# Patient Record
Sex: FEMALE | Race: BLACK OR AFRICAN AMERICAN | NOT HISPANIC OR LATINO | Employment: UNEMPLOYED | URBAN - METROPOLITAN AREA
[De-identification: names, ages, dates, MRNs, and addresses within clinical notes are randomized per-mention and may not be internally consistent; named-entity substitution may affect disease eponyms.]

---

## 2017-08-18 ENCOUNTER — TRANSFERRED RECORDS (OUTPATIENT)
Dept: HEALTH INFORMATION MANAGEMENT | Facility: CLINIC | Age: 57
End: 2017-08-18

## 2017-10-05 ENCOUNTER — TRANSFERRED RECORDS (OUTPATIENT)
Dept: HEALTH INFORMATION MANAGEMENT | Facility: CLINIC | Age: 57
End: 2017-10-05

## 2017-11-13 ENCOUNTER — TRANSFERRED RECORDS (OUTPATIENT)
Dept: HEALTH INFORMATION MANAGEMENT | Facility: CLINIC | Age: 57
End: 2017-11-13

## 2017-11-14 ENCOUNTER — TRANSFERRED RECORDS (OUTPATIENT)
Dept: HEALTH INFORMATION MANAGEMENT | Facility: CLINIC | Age: 57
End: 2017-11-14

## 2020-06-12 ENCOUNTER — OFFICE VISIT (OUTPATIENT)
Dept: FAMILY MEDICINE | Facility: CLINIC | Age: 60
End: 2020-06-12
Payer: COMMERCIAL

## 2020-06-12 ENCOUNTER — ANCILLARY PROCEDURE (OUTPATIENT)
Dept: GENERAL RADIOLOGY | Facility: CLINIC | Age: 60
End: 2020-06-12
Attending: PHYSICIAN ASSISTANT
Payer: COMMERCIAL

## 2020-06-12 VITALS
RESPIRATION RATE: 19 BRPM | HEART RATE: 102 BPM | SYSTOLIC BLOOD PRESSURE: 141 MMHG | WEIGHT: 187 LBS | TEMPERATURE: 98.5 F | OXYGEN SATURATION: 98 % | DIASTOLIC BLOOD PRESSURE: 101 MMHG

## 2020-06-12 DIAGNOSIS — R60.9 EDEMA, UNSPECIFIED TYPE: ICD-10-CM

## 2020-06-12 DIAGNOSIS — R09.82 PND (POST-NASAL DRIP): ICD-10-CM

## 2020-06-12 DIAGNOSIS — I50.9 ACUTE ON CHRONIC CONGESTIVE HEART FAILURE, UNSPECIFIED HEART FAILURE TYPE (H): ICD-10-CM

## 2020-06-12 DIAGNOSIS — Z11.4 SCREENING FOR HIV (HUMAN IMMUNODEFICIENCY VIRUS): ICD-10-CM

## 2020-06-12 DIAGNOSIS — I10 BENIGN ESSENTIAL HYPERTENSION: Primary | ICD-10-CM

## 2020-06-12 DIAGNOSIS — Z11.59 ENCOUNTER FOR HEPATITIS C SCREENING TEST FOR LOW RISK PATIENT: ICD-10-CM

## 2020-06-12 LAB
ALBUMIN SERPL-MCNC: 3.1 G/DL (ref 3.4–5)
ALP SERPL-CCNC: 57 U/L (ref 40–150)
ALT SERPL W P-5'-P-CCNC: 43 U/L (ref 0–50)
ANION GAP SERPL CALCULATED.3IONS-SCNC: 6 MMOL/L (ref 3–14)
AST SERPL W P-5'-P-CCNC: 33 U/L (ref 0–45)
BASOPHILS # BLD AUTO: 0 10E9/L (ref 0–0.2)
BASOPHILS NFR BLD AUTO: 0.3 %
BILIRUB SERPL-MCNC: 0.8 MG/DL (ref 0.2–1.3)
BUN SERPL-MCNC: 13 MG/DL (ref 7–30)
CALCIUM SERPL-MCNC: 8.7 MG/DL (ref 8.5–10.1)
CHLORIDE SERPL-SCNC: 110 MMOL/L (ref 94–109)
CHOLEST SERPL-MCNC: 206 MG/DL
CO2 SERPL-SCNC: 24 MMOL/L (ref 20–32)
CREAT SERPL-MCNC: 0.88 MG/DL (ref 0.52–1.04)
CREAT UR-MCNC: 237 MG/DL
DIFFERENTIAL METHOD BLD: ABNORMAL
EOSINOPHIL # BLD AUTO: 0.2 10E9/L (ref 0–0.7)
EOSINOPHIL NFR BLD AUTO: 3.5 %
ERYTHROCYTE [DISTWIDTH] IN BLOOD BY AUTOMATED COUNT: 15.5 % (ref 10–15)
GFR SERPL CREATININE-BSD FRML MDRD: 71 ML/MIN/{1.73_M2}
GLUCOSE SERPL-MCNC: 95 MG/DL (ref 70–99)
HCT VFR BLD AUTO: 45.1 % (ref 35–47)
HDLC SERPL-MCNC: 59 MG/DL
HGB BLD-MCNC: 14.9 G/DL (ref 11.7–15.7)
LDLC SERPL CALC-MCNC: 118 MG/DL
LYMPHOCYTES # BLD AUTO: 1.9 10E9/L (ref 0.8–5.3)
LYMPHOCYTES NFR BLD AUTO: 33.1 %
MCH RBC QN AUTO: 29.5 PG (ref 26.5–33)
MCHC RBC AUTO-ENTMCNC: 33 G/DL (ref 31.5–36.5)
MCV RBC AUTO: 89 FL (ref 78–100)
MICROALBUMIN UR-MCNC: 104 MG/L
MICROALBUMIN/CREAT UR: 43.88 MG/G CR (ref 0–25)
MONOCYTES # BLD AUTO: 0.6 10E9/L (ref 0–1.3)
MONOCYTES NFR BLD AUTO: 9.9 %
NEUTROPHILS # BLD AUTO: 3.1 10E9/L (ref 1.6–8.3)
NEUTROPHILS NFR BLD AUTO: 53.2 %
NONHDLC SERPL-MCNC: 147 MG/DL
NT-PROBNP SERPL-MCNC: 2082 PG/ML (ref 0–125)
PLATELET # BLD AUTO: 285 10E9/L (ref 150–450)
POTASSIUM SERPL-SCNC: 4.3 MMOL/L (ref 3.4–5.3)
PROT SERPL-MCNC: 6.7 G/DL (ref 6.8–8.8)
RBC # BLD AUTO: 5.05 10E12/L (ref 3.8–5.2)
SODIUM SERPL-SCNC: 140 MMOL/L (ref 133–144)
TRIGL SERPL-MCNC: 145 MG/DL
TSH SERPL DL<=0.005 MIU/L-ACNC: 0.76 MU/L (ref 0.4–4)
WBC # BLD AUTO: 5.8 10E9/L (ref 4–11)

## 2020-06-12 PROCEDURE — 87389 HIV-1 AG W/HIV-1&-2 AB AG IA: CPT | Performed by: PHYSICIAN ASSISTANT

## 2020-06-12 PROCEDURE — 82043 UR ALBUMIN QUANTITATIVE: CPT | Performed by: PHYSICIAN ASSISTANT

## 2020-06-12 PROCEDURE — 83880 ASSAY OF NATRIURETIC PEPTIDE: CPT | Performed by: PHYSICIAN ASSISTANT

## 2020-06-12 PROCEDURE — 36415 COLL VENOUS BLD VENIPUNCTURE: CPT | Performed by: PHYSICIAN ASSISTANT

## 2020-06-12 PROCEDURE — 71046 X-RAY EXAM CHEST 2 VIEWS: CPT

## 2020-06-12 PROCEDURE — 80061 LIPID PANEL: CPT | Performed by: PHYSICIAN ASSISTANT

## 2020-06-12 PROCEDURE — 93000 ELECTROCARDIOGRAM COMPLETE: CPT | Performed by: PHYSICIAN ASSISTANT

## 2020-06-12 PROCEDURE — 86803 HEPATITIS C AB TEST: CPT | Performed by: PHYSICIAN ASSISTANT

## 2020-06-12 PROCEDURE — 99204 OFFICE O/P NEW MOD 45 MIN: CPT | Performed by: PHYSICIAN ASSISTANT

## 2020-06-12 PROCEDURE — 80050 GENERAL HEALTH PANEL: CPT | Performed by: PHYSICIAN ASSISTANT

## 2020-06-12 RX ORDER — AMLODIPINE BESYLATE 5 MG/1
TABLET ORAL
COMMUNITY
Start: 2020-06-07 | End: 2020-06-12

## 2020-06-12 RX ORDER — HYDROCHLOROTHIAZIDE 12.5 MG/1
12.5 TABLET ORAL DAILY
Qty: 90 TABLET | Refills: 1 | Status: SHIPPED | OUTPATIENT
Start: 2020-06-12 | End: 2020-09-14

## 2020-06-12 RX ORDER — FLUTICASONE PROPIONATE 50 MCG
2 SPRAY, SUSPENSION (ML) NASAL DAILY
Qty: 18 ML | Refills: 3 | Status: SHIPPED | OUTPATIENT
Start: 2020-06-12 | End: 2021-06-04

## 2020-06-12 RX ORDER — AMLODIPINE BESYLATE 5 MG/1
5 TABLET ORAL DAILY
Qty: 90 TABLET | Refills: 1 | Status: SHIPPED | OUTPATIENT
Start: 2020-06-12 | End: 2020-09-14

## 2020-06-12 NOTE — PROGRESS NOTES
Subjective     Media Disha Street is a 59 year old female who presents to clinic today for the following health issues:    HPI     Hypertension Follow-up      Do you check your blood pressure regularly outside of the clinic? Yes - still abnormal/high lately    Are you following a low salt diet? Yes    Are your blood pressures ever more than 140 on the top number (systolic) OR more   than 90 on the bottom number (diastolic), for example 140/90? Yes    Patient has been tested for COVID-19 over the past 3 weeks with negative results. Last test done mid-May.  Symptoms have stayed the same since last tested and even before that since end of February 2020.    She notes being told her heart was a little enlarged, per recent CHEST XRAY (normal otherwise).  Patient takes hypertension medicine (amlodipine 5 mg - restarted 1 week ago, no issues), she hadn't been taking it regularly though for the past 2.5 years or so (moved from [place]).  History of CHF initially maybe 10 years ago - stress, hypertension etc caused it, per patient.    Patient notes cough off/on (by end of day and with exertion and poor diet), notes worse when she gets swollen/edema as well. Some shortness of breath off/on with exertion especially or diet is worse.    NO fever, chills, night sweats. No sore throat, sinus congestion/cough.  She notes weight gain as well, water weight noted as well.      Current symptoms feels just like previous CHF exacerbation, last one 2.5 years ago.      Patient Active Problem List   Diagnosis     Benign essential hypertension     Acute on chronic congestive heart failure, unspecified heart failure type (H)     Edema, unspecified type     History reviewed. No pertinent surgical history.    Social History     Tobacco Use     Smoking status: Not on file   Substance Use Topics     Alcohol use: Not on file     History reviewed. No pertinent family history.      Current Outpatient Medications   Medication Sig Dispense Refill      amLODIPine (NORVASC) 5 MG tablet Take 1 tablet (5 mg) by mouth daily 90 tablet 1     fluticasone (FLONASE) 50 MCG/ACT nasal spray Spray 2 sprays into both nostrils daily 18 mL 3     hydrochlorothiazide (HYDRODIURIL) 12.5 MG tablet Take 1 tablet (12.5 mg) by mouth daily 90 tablet 1     Allergies not on file  No lab results found.   BP Readings from Last 3 Encounters:   06/12/20 (!) 141/101    Wt Readings from Last 3 Encounters:   06/12/20 84.8 kg (187 lb)                    Reviewed and updated as needed this visit by Provider  Meds  Problems  Med Hx  Surg Hx  Fam Hx         Review of Systems   Constitutional, HEENT, cardiovascular, pulmonary, GI, , musculoskeletal, neuro, skin, endocrine and psych systems are negative, except as otherwise noted.      Objective    BP (!) 141/101 (BP Location: Left arm, Patient Position: Sitting, Cuff Size: Adult Regular)   Pulse 102   Temp 98.5  F (36.9  C) (Oral)   Resp 19   Wt 84.8 kg (187 lb)   SpO2 98%   There is no height or weight on file to calculate BMI.  Physical Exam   GENERAL: healthy, alert and no distress  NECK: no adenopathy, no asymmetry, masses, or scars and thyroid normal to palpation  RESP: lungs clear to auscultation - no rales, rhonchi or wheezes  CV: regular rate and rhythm, normal S1 S2, no S3 or S4, no murmur, click or rub, no peripheral edema and peripheral pulses strong  ABDOMEN: soft, nontender, no hepatosplenomegaly, no masses and bowel sounds normal  MS: no gross musculoskeletal defects noted, 1-2+ pitting edema bilateral (left > right)  PSYCH: mentation appears normal, affect normal/bright    Diagnostic Test Results:  Labs reviewed in [facility]  EKG - cardiology over-read agrees with LVH and suggests ST changes may be related to this; will follow up with cardiology and get ECHO.        Assessment & Plan     1. Benign essential hypertension  Long standing, chronic, needs better control - labs updated today; continue amlodipine 5 mg daily with  addition of hydrochlorothiazide sent to pharmacy; follow up with cardiology scheduled today and for BP/lab recheck in 2 weeks.  - Comprehensive metabolic panel  - CBC with platelets differential  - Lipid panel reflex to direct LDL Fasting  - TSH with free T4 reflex  - Albumin Random Urine Quantitative with Creat Ratio  - BNP-N terminal pro  - EKG 12-lead complete w/read - Clinics  - CARDIOLOGY EVAL ADULT REFERRAL; Future  - NUTRITION REFERRAL  - amLODIPine (NORVASC) 5 MG tablet; Take 1 tablet (5 mg) by mouth daily  Dispense: 90 tablet; Refill: 1  - hydrochlorothiazide (HYDRODIURIL) 12.5 MG tablet; Take 1 tablet (12.5 mg) by mouth daily  Dispense: 90 tablet; Refill: 1  - Comprehensive metabolic panel (BMP + Alb, Alk Phos, ALT, AST, Total. Bili, TP); Future    2. Edema, unspecified type/CHF  Most likely due to CHF and uncontrolled HYPERTENSION - low dose water pill with option to increase in a few weeks discussed with patient at length; will continue with diet and exercise adjustments.  - BNP-N terminal pro  - EKG 12-lead complete w/read - Clinics  - CARDIOLOGY EVAL ADULT REFERRAL; Future  - NUTRITION REFERRAL  - XR Chest 2 Views; Future  - hydrochlorothiazide (HYDRODIURIL) 12.5 MG tablet; Take 1 tablet (12.5 mg) by mouth daily  Dispense: 90 tablet; Refill: 1  - Comprehensive metabolic panel (BMP + Alb, Alk Phos, ALT, AST, Total. Bili, TP); Future    3. PND (post-nasal drip)  Related to fluid overload vs just post nasal drip vs acid reflux - start with above and trial of flonase; but consider acid reflux treatment if no improvement.  - fluticasone (FLONASE) 50 MCG/ACT nasal spray; Spray 2 sprays into both nostrils daily  Dispense: 18 mL; Refill: 3  - XR Chest 2 Views; Future         Patient Instructions   Continue with amlodipine 5 mg daily and add hydrochlorothiazide 12.5 mg daily.  Check BP readings at home and return to clinic in 2 weeks for BP recheck and repeat labs.    I would also encourage you to eat some  potassium rich foods in the mean time - bananas, tomatoes, sweet potatoes, orange juice, beet green, white beans, dates/raisins, coconut water and yogurts are good sources of potassium.     Trial of Flonase - 2 sprays per nostrils daily for possible post nasal drip.  If no improvement, consider acid reflux treatment as well.    Patient follow up with cardiology - referral placed today.    Return to clinic with any worsening or changes in symptoms or go to ER Urgent care in off hours.      Return in about 2 weeks (around 6/26/2020) for BP Recheck, or sooner with worsening symptoms.    Jaye Quinones PA-C  [facility]

## 2020-06-12 NOTE — RESULT ENCOUNTER NOTE
"Results discussed with patient at time of visit.  Cardiology over-read noted nothing acute, but LVH with some ST changes most likely due to this.  Jaye \"Virgilio\" EAGLE Quinones"

## 2020-06-12 NOTE — PATIENT INSTRUCTIONS
Continue with amlodipine 5 mg daily and add hydrochlorothiazide 12.5 mg daily.  Check BP readings at home and return to clinic in 2 weeks for BP recheck and repeat labs.    I would also encourage you to eat some potassium rich foods in the mean time - bananas, tomatoes, sweet potatoes, orange juice, beet green, white beans, dates/raisins, coconut water and yogurts are good sources of potassium.     Trial of Flonase - 2 sprays per nostrils daily for possible post nasal drip.  If no improvement, consider acid reflux treatment as well.    Patient follow up with cardiology - referral placed today.    Return to clinic with any worsening or changes in symptoms or go to ER Urgent care in off hours.

## 2020-06-12 NOTE — TELEPHONE ENCOUNTER
Action    Action Taken 6-12: called pt and lvm for her to return my call about where she has been seen. FP note says she moved here from [place], but no records come up for [facility]. Area code suggests she's from [facility], but clinics connected in Care Everywhere don't have records. No area clinics have records in   6-12: spoke to pt, she was hospitalized at [facility] around 2017 where they diagnosed her with CHF and HTN. Requested all records from [facility]  6-16: resolved images in PACS, sent EKGs to scanning and to [facility] email.

## 2020-06-14 LAB
HCV AB SERPL QL IA: NONREACTIVE
HIV 1+2 AB+HIV1 P24 AG SERPL QL IA: NONREACTIVE

## 2020-06-15 ENCOUNTER — DOCUMENTATION ONLY (OUTPATIENT)
Dept: CARE COORDINATION | Facility: CLINIC | Age: 60
End: 2020-06-15

## 2020-06-16 ENCOUNTER — VIRTUAL VISIT (OUTPATIENT)
Dept: CARDIOLOGY | Facility: CLINIC | Age: 60
End: 2020-06-16
Attending: PHYSICIAN ASSISTANT
Payer: COMMERCIAL

## 2020-06-16 ENCOUNTER — PRE VISIT (OUTPATIENT)
Dept: CARDIOLOGY | Facility: CLINIC | Age: 60
End: 2020-06-16

## 2020-06-16 DIAGNOSIS — I10 BENIGN ESSENTIAL HYPERTENSION: ICD-10-CM

## 2020-06-16 DIAGNOSIS — R60.9 EDEMA, UNSPECIFIED TYPE: ICD-10-CM

## 2020-06-16 PROCEDURE — 99203 OFFICE O/P NEW LOW 30 MIN: CPT | Mod: 95 | Performed by: INTERNAL MEDICINE

## 2020-06-16 ASSESSMENT — PAIN SCALES - GENERAL: PAINLEVEL: NO PAIN (0)

## 2020-06-16 NOTE — PROGRESS NOTES
"Rayo Street is a 59 year old female who is being evaluated via a billable telephone visit.      The patient has been notified of following:     \"This telephone visit will be conducted via a call between you and your physician/provider. We have found that certain health care needs can be provided without the need for a physical exam.  This service lets us provide the care you need with a short phone conversation.  If a prescription is necessary we can send it directly to your pharmacy.  If lab work is needed we can place an order for that and you can then stop by our lab to have the test done at a later time.    Telephone visits are billed at different rates depending on your insurance coverage. During this emergency period, for some insurers they may be billed the same as an in-person visit.  Please reach out to your insurance provider with any questions.    If during the course of the call the physician/provider feels a telephone visit is not appropriate, you will not be charged for this service.\"    Patient has given verbal consent for Telephone visit?  Yes    What phone number would you like to be contacted at? [phone]    How would you like to obtain your AVS? Mail a copy     Medications were reconciled.     Jackeline Hayden CMA    8:19 AM       SUBJECTIVE:  Rayo Street is a 59 year old female who presents for evaluation of hypertension and CHF.    Recently patient had an episode of abdominal distention and shortness of breath.  He is suspected to be COVID-19 infection and went to [facility] in [place].  There she was diagnosed with congestive heart failure and hypertension.  Patient do not have a prior history of hypertension.  As per patient she is a  involving heavy activity.  She was getting to a point where she was unable to do the work and she quit.  At the time of discharge patient was prescribed amlodipine, furosemide, and hydrochlorothiazide.  She is taking " all these 3 medications.  She has not checked her blood pressure as she has no bacteria for her blood pressure machine.    As per patient she had a nuclear stress test but she do not remember the result of any test.    Patient has no prior cardiac history.  No diabetes.  Patient is a non-smoker.  No excess alcohol.    Patient Active Problem List    Diagnosis Date Noted     Benign essential hypertension 06/12/2020     Priority: Medium     Acute on chronic congestive heart failure, unspecified heart failure type (H) 06/12/2020     Priority: Medium     Edema, unspecified type 06/12/2020     Priority: Medium    .  Current Outpatient Medications   Medication Sig     amLODIPine (NORVASC) 5 MG tablet Take 1 tablet (5 mg) by mouth daily     fluticasone (FLONASE) 50 MCG/ACT nasal spray Spray 2 sprays into both nostrils daily     hydrochlorothiazide (HYDRODIURIL) 12.5 MG tablet Take 1 tablet (12.5 mg) by mouth daily     No current facility-administered medications for this visit.      No past medical history on file.  No past surgical history on file.  No Known Allergies  Social History     Socioeconomic History     Marital status:      Spouse name: Not on file     Number of children: Not on file     Years of education: Not on file     Highest education level: Not on file   Occupational History     Not on file   Social Needs     Financial resource strain: Not on file     Food insecurity     Worry: Not on file     Inability: Not on file     Transportation needs     Medical: Not on file     Non-medical: Not on file   Tobacco Use     Smoking status: Never Smoker     Smokeless tobacco: Never Used   Substance and Sexual Activity     Alcohol use: Not on file     Drug use: Not on file     Sexual activity: Not on file   Lifestyle     Physical activity     Days per week: Not on file     Minutes per session: Not on file     Stress: Not on file   Relationships     Social connections     Talks on phone: Not on file     Gets  together: Not on file     Attends Scientology service: Not on file     Active member of club or organization: Not on file     Attends meetings of clubs or organizations: Not on file     Relationship status: Not on file     Intimate partner violence     Fear of current or ex partner: Not on file     Emotionally abused: Not on file     Physically abused: Not on file     Forced sexual activity: Not on file   Other Topics Concern     Not on file   Social History Narrative     Not on file     No family history on file.       REVIEW OF SYSTEMS:  General: negative, fever, chills, night sweats  Skin: negative, acne, rash and scaling  Eyes: negative, double vision, eye pain and photophobia  Ears/Nose/Throat: negative, nasal congestion and purulent rhinorrhea  Respiratory: No dyspnea on exertion, No cough, No hemoptysis and negative  Cardiovascular: negative, palpitations, tachycardia, irregular heart beat, chest pain, exertional chest pain or pressure, paroxysmal nocturnal dyspnea, dyspnea on exertion and orthopnea  Gastrointestinal: negative, dysphagia, nausea, vomiting and heartburn  Genitourinary: negative, nocturia, dysuria and frequency  Musculoskeletal: negative, fracture, back pain and neck pain  Neurologic: negative, headaches, syncope, stroke and seizures  Psychiatric: negative, nervous breakdown, thoughts of self-harm and thoughts of hurting someone else  Hematologic/Lymphatic/Immunologic: negative, bleeding disorder, chills, fever and hay fever  Endocrine: negative, cold intolerance, heat intolerance and hot flashes         ASSESSMENT/PLAN:  Patient here for evaluation of hypertension and one episode of admission with shortness of breath which was diagnosed as CHF.  Patient was started on amlodipine, hydrochlorothiazide and furosemide.  Currently her blood pressure is unknown.  Patient is feeling much better and she is asymptomatic.  Patient has no prior cardiac history.  Hypertension was diagnosed at this time of  admission.  Patient without significant cardiac risk factors.  No diabetes.  .  Non-smoker no premature coronary artery disease in family.  Reviewed EKGs.  Sinus tachycardia.  LVH with a strain.    Before proceeding with further testing will obtain the stress test result and if possible echo result from [facility].  We will plan further evaluation and management after this.    As patient is on 2 diuretics will plan pleural with basic metabolic panel.  We will also get an echocardiogram.    Meanwhile patient is advised to continue her current blood pressure medications.  Patient will be checking her blood pressure and will contact the patient to see the blood pressure results.    Patient will keep a 3-month follow-up .

## 2020-06-16 NOTE — LETTER
"6/16/2020    RE: Rayo Street  [address]  [facility] [facility] [address]       Dear Colleague,    Thank you for the opportunity to participate in the care of your patient, Rayo Street, at the [facility]. Please see a copy of my visit note below.    Rayo Street is a 59 year old female who is being evaluated via a billable telephone visit.      The patient has been notified of following:     \"This telephone visit will be conducted via a call between you and your physician/provider. We have found that certain health care needs can be provided without the need for a physical exam.  This service lets us provide the care you need with a short phone conversation.  If a prescription is necessary we can send it directly to your pharmacy.  If lab work is needed we can place an order for that and you can then stop by our lab to have the test done at a later time.    Telephone visits are billed at different rates depending on your insurance coverage. During this emergency period, for some insurers they may be billed the same as an in-person visit.  Please reach out to your insurance provider with any questions.    If during the course of the call the physician/provider feels a telephone visit is not appropriate, you will not be charged for this service.\"    Patient has given verbal consent for Telephone visit?  Yes    What phone number would you like to be contacted at? [phone]    How would you like to obtain your AVS? Mail a copy     Medications were reconciled.     Jackeline Hayden CMA    8:19 AM       SUBJECTIVE:  Rayo Street is a 59 year old female who presents for evaluation of hypertension and CHF.    Recently patient had an episode of abdominal distention and shortness of breath.  He is suspected to be COVID-19 infection and went to [facility] in [place].  There she was diagnosed with congestive heart failure " and hypertension.  Patient do not have a prior history of hypertension.  As per patient she is a  involving heavy activity.  She was getting to a point where she was unable to do the work and she quit.  At the time of discharge patient was prescribed amlodipine, furosemide, and hydrochlorothiazide.  She is taking all these 3 medications.  She has not checked her blood pressure as she has no bacteria for her blood pressure machine.    As per patient she had a nuclear stress test but she do not remember the result of any test.    Patient has no prior cardiac history.  No diabetes.  Patient is a non-smoker.  No excess alcohol.    Patient Active Problem List    Diagnosis Date Noted     Benign essential hypertension 06/12/2020     Priority: Medium     Acute on chronic congestive heart failure, unspecified heart failure type (H) 06/12/2020     Priority: Medium     Edema, unspecified type 06/12/2020     Priority: Medium    .  Current Outpatient Medications   Medication Sig     amLODIPine (NORVASC) 5 MG tablet Take 1 tablet (5 mg) by mouth daily     fluticasone (FLONASE) 50 MCG/ACT nasal spray Spray 2 sprays into both nostrils daily     hydrochlorothiazide (HYDRODIURIL) 12.5 MG tablet Take 1 tablet (12.5 mg) by mouth daily     No current facility-administered medications for this visit.      No past medical history on file.  No past surgical history on file.  No Known Allergies  Social History     Socioeconomic History     Marital status:      Spouse name: Not on file     Number of children: Not on file     Years of education: Not on file     Highest education level: Not on file   Occupational History     Not on file   Social Needs     Financial resource strain: Not on file     Food insecurity     Worry: Not on file     Inability: Not on file     Transportation needs     Medical: Not on file     Non-medical: Not on file   Tobacco Use     Smoking status: Never Smoker     Smokeless tobacco: Never Used    Substance and Sexual Activity     Alcohol use: Not on file     Drug use: Not on file     Sexual activity: Not on file   Lifestyle     Physical activity     Days per week: Not on file     Minutes per session: Not on file     Stress: Not on file   Relationships     Social connections     Talks on phone: Not on file     Gets together: Not on file     Attends Taoist service: Not on file     Active member of club or organization: Not on file     Attends meetings of clubs or organizations: Not on file     Relationship status: Not on file     Intimate partner violence     Fear of current or ex partner: Not on file     Emotionally abused: Not on file     Physically abused: Not on file     Forced sexual activity: Not on file   Other Topics Concern     Not on file   Social History Narrative     Not on file     No family history on file.       REVIEW OF SYSTEMS:  General: negative, fever, chills, night sweats  Skin: negative, acne, rash and scaling  Eyes: negative, double vision, eye pain and photophobia  Ears/Nose/Throat: negative, nasal congestion and purulent rhinorrhea  Respiratory: No dyspnea on exertion, No cough, No hemoptysis and negative  Cardiovascular: negative, palpitations, tachycardia, irregular heart beat, chest pain, exertional chest pain or pressure, paroxysmal nocturnal dyspnea, dyspnea on exertion and orthopnea  Gastrointestinal: negative, dysphagia, nausea, vomiting and heartburn  Genitourinary: negative, nocturia, dysuria and frequency  Musculoskeletal: negative, fracture, back pain and neck pain  Neurologic: negative, headaches, syncope, stroke and seizures  Psychiatric: negative, nervous breakdown, thoughts of self-harm and thoughts of hurting someone else  Hematologic/Lymphatic/Immunologic: negative, bleeding disorder, chills, fever and hay fever  Endocrine: negative, cold intolerance, heat intolerance and hot flashes         ASSESSMENT/PLAN:  Patient here for evaluation of hypertension and one  episode of admission with shortness of breath which was diagnosed as CHF.  Patient was started on amlodipine, hydrochlorothiazide and furosemide.  Currently her blood pressure is unknown.  Patient is feeling much better and she is asymptomatic.  Patient has no prior cardiac history.  Hypertension was diagnosed at this time of admission.  Patient without significant cardiac risk factors.  No diabetes.  .  Non-smoker no premature coronary artery disease in family.  Reviewed EKGs.  Sinus tachycardia.  LVH with a strain.    Before proceeding with further testing will obtain the stress test result and if possible echo result from [facility].  We will plan further evaluation and management after this.    As patient is on 2 diuretics will plan pleural with basic metabolic panel.  We will also get an echocardiogram.    Meanwhile patient is advised to continue her current blood pressure medications.  Patient will be checking her blood pressure and will contact the patient to see the blood pressure results.    Patient will keep a 3-month follow-up .    Please do not hesitate to contact me if you have any questions/concerns.     Sincerely,     IRIAN Bowden MD

## 2020-07-15 ENCOUNTER — OFFICE VISIT (OUTPATIENT)
Dept: URGENT CARE | Facility: URGENT CARE | Age: 60
End: 2020-07-15
Payer: COMMERCIAL

## 2020-07-15 VITALS
BODY MASS INDEX: 30.66 KG/M2 | TEMPERATURE: 98.4 F | OXYGEN SATURATION: 98 % | HEIGHT: 65 IN | SYSTOLIC BLOOD PRESSURE: 138 MMHG | WEIGHT: 184 LBS | HEART RATE: 80 BPM | DIASTOLIC BLOOD PRESSURE: 84 MMHG | RESPIRATION RATE: 16 BRPM

## 2020-07-15 DIAGNOSIS — R60.0 SUBLINGUAL GLAND SWELLING: Primary | ICD-10-CM

## 2020-07-15 PROCEDURE — 99214 OFFICE O/P EST MOD 30 MIN: CPT | Performed by: INTERNAL MEDICINE

## 2020-07-15 RX ORDER — NAPROXEN 500 MG/1
500 TABLET ORAL 2 TIMES DAILY WITH MEALS
Qty: 28 TABLET | Refills: 0 | Status: SHIPPED | OUTPATIENT
Start: 2020-07-15 | End: 2020-07-29

## 2020-07-15 ASSESSMENT — ENCOUNTER SYMPTOMS
HEADACHES: 0
FEVER: 0
MYALGIAS: 0
COUGH: 0
SORE THROAT: 0

## 2020-07-15 ASSESSMENT — MIFFLIN-ST. JEOR: SCORE: 1397.56

## 2020-07-15 NOTE — PROGRESS NOTES
"SUBJECTIVE:   Media Disha Street is a 59 year old female presenting with a chief complaint of   Chief Complaint   Patient presents with     Urgent Care     swollen lymphnode     swollen lymphnode left side of neck x 2 days.        She is an established patient of [facility].    Adult    Onset of symptoms was 2 day(s) ago.  Current and Associated symptoms:  left neck area lymph node swollen  nontender     Treatment measures tried include None tried.  Predisposing factors include ill contact: none.        Review of Systems   Constitutional: Negative for fever.   HENT: Negative for sore throat.    Respiratory: Negative for cough.    Musculoskeletal: Negative for myalgias.   Skin: Negative for rash.   Neurological: Negative for headaches.       No past medical history on file.  No family history on file.  Current Outpatient Medications   Medication Sig Dispense Refill     amLODIPine (NORVASC) 5 MG tablet Take 1 tablet (5 mg) by mouth daily 90 tablet 1     amoxicillin-clavulanate (AUGMENTIN) 875-125 MG tablet Take 1 tablet by mouth 2 times daily for 7 days 14 tablet 0     fluticasone (FLONASE) 50 MCG/ACT nasal spray Spray 2 sprays into both nostrils daily 18 mL 3     naproxen (NAPROSYN) 500 MG tablet Take 1 tablet (500 mg) by mouth 2 times daily (with meals) for 14 days 28 tablet 0     hydrochlorothiazide (HYDRODIURIL) 12.5 MG tablet Take 1 tablet (12.5 mg) by mouth daily 90 tablet 1     Social History     Tobacco Use     Smoking status: Never Smoker     Smokeless tobacco: Never Used   Substance Use Topics     Alcohol use: Not on file       OBJECTIVE  /84   Pulse 80   Temp 98.4  F (36.9  C) (Oral)   Resp 16   Ht 1.638 m (5' 4.5\")   Wt 83.5 kg (184 lb)   SpO2 98%   Breastfeeding No   BMI 31.10 kg/m      Physical Exam  Vitals signs reviewed.   Constitutional:       Appearance: Normal appearance.   HENT:      Right Ear: Tympanic membrane normal.      Left Ear: Tympanic membrane normal.      Mouth/Throat:      " Mouth: Mucous membranes are moist.      Pharynx: No oropharyngeal exudate or posterior oropharyngeal erythema.      Comments: Under tongue - left ampulla of sublingual gland red slightly swollen  Eyes:      Conjunctiva/sclera: Conjunctivae normal.   Neck:      Comments: left mass oval 1x3 cm firm under left chin area  Cardiovascular:      Rate and Rhythm: Normal rate and regular rhythm.      Pulses: Normal pulses.      Heart sounds: Normal heart sounds.   Pulmonary:      Effort: Pulmonary effort is normal.      Breath sounds: Normal breath sounds.   Lymphadenopathy:      Cervical: No cervical adenopathy.   Neurological:      Mental Status: She is alert.         Labs:  No results found for this or any previous visit (from the past 24 hour(s)).        ASSESSMENT:      ICD-10-CM    1. Sublingual gland swelling  R22.0 OTOLARYNGOLOGY REFERRAL     amoxicillin-clavulanate (AUGMENTIN) 875-125 MG tablet     naproxen (NAPROSYN) 500 MG tablet        Medical Decision Making:  Unclear etiology of sublingual angle gland swelling with inflammation of ampulla of gland  Differential Diagnosis: Infection/bacterial or viral, inflammation, stone or common causes    PLAN:  Encouraged fluids and sucking on candies to promote this saliva production    Patient Instructions     augmentin 2 x day for 1 week   Naprosyn 500 mg 2 x day with food    Referral Otolaryngology       Patient Education     Salivary Gland Stones  Salivary glands make saliva. Saliva is mostly water. It also has minerals and proteins that help break down food and keep the mouth and teeth healthy. There are 3 pairs of salivary glands:    Parotid glands (in front of the ear)    Submandibular glands (below the jaw)    Sublingual glands (below the tongue)  Each gland has a tube (duct channel) that lets saliva flow from the gland to the mouth. A salivary gland stone can form as a result of poor salivary flow. This lets minerals build up and create a stone. When a stone forms,  it blocks the flow of saliva. The gland swells and becomes painful. Symptoms may be worse when eating. This is because food stimulates the flow of saliva.  A blocked salivary gland may become infected. This can cause worsened pain, redness over the gland, and fever.  Tests that help diagnose a salivary gland stone include CT-scan, X-ray, ultrasound, or injection of dye into the salivary duct. A stone may be removed or allowed to pass on its own.  Home care    Unless another medicine was prescribed, take over-the-counter medicines, such as acetaminophen or ibuprofen, to help relieve pain.    Moist heat can also help relieve pain. Wet a cloth with warm water and put it over the affected gland for 10-15 minutes several times a day.    Gently massage the gland a few times a day.     Suck on lemon or other tart hard candies to cause flow of saliva.    To help prevent future stones:  ? Drink 6-8 glasses of fluid per day (such as water, tea, and clear soup) to keep well hydrated.  ? If you smoke, ask your healthcare provider for help to quit. Smoking makes salivary gland stones more likely.  ? Keep good dental hygiene. Brush and floss your teeth daily. See your dentist for regular cleanings.    Follow-up care  Follow up with your healthcare provider or as advised.  When to seek medical advice  Call your healthcare provider if any of the following occur:    Pain or swelling in the gland that gets worse    Can't open mouth or pain when opening mouth    Fever of 100.4 F (38 C) or higher, or as directed by your healthcare provider    Redness over the sore gland    Pus draining into the mouth    Trouble swallowing or breathing  Date Last Reviewed: 10/1/2017    9814-4853 [employer]. [address] [place] [address], [place] [address]. All rights reserved. This information is not intended as a substitute for professional medical care. Always follow your healthcare professional's instructions.           Patient Education  "    Salivary Gland Infection  Salivary glands make saliva. Saliva is mostly water. It also has minerals and proteins that help break down food and keep the mouth and teeth healthy. There are 3 pairs of salivary glands:    Parotid glands (in front of the ear)    Submandibular glands (below the jaw)    Sublingual glands (below the tongue)  A salivary gland can become infected by bacteria (germs). Things that make this more likely include dehydration and taking medicines that affect saliva flow. Infection is also more likely when the tube (duct) that carries saliva from the gland to the mouth is blocked. It may be blocked by a salivary gland \"stone.\" This is a collection of minerals that forms in the salivary gland.  Signs of infection include fever, severe pain in the gland, and redness and swelling over the gland. It may hurt to open the mouth. Symptoms may be worse when the flow of saliva is stimulated, such as by the smell of food.   Antibiotics are used to treat the infection. Drainage of the infection with a simple surgery may be needed. If you have a salivary gland stone, a procedure may be done to remove it.  Home Care:    Take antibiotics as directed until they are finished. Do this even if you start to feel better after only a few days.    Unless another medicine was prescribed, take over-the-counter medicines, such as acetaminophen or ibuprofen, to help relieve pain.    Moist heat can also help relieve swelling and pain. Wet a cloth with warm water and put it over the sore gland for 10-15 minutes several times a day.    Gently massage the gland a few times a day.     Suck on lemon or other tart hard candies to cause flow of saliva.  To help prevent stones and infections:    Drink 6-8 glasses of fluid per day (such as water, tea, and clear soup) to keep well hydrated.    If you smoke, ask your healthcare provider for help to quit. Smoking makes salivary gland stones more likely.    Keep good dental hygiene. " Brush and floss your teeth daily. See your dentist for regular cleanings.  Follow-up care  Follow up with your healthcare provider or as advised.   When to seek medical advice  Call your healthcare provider if any of the following occur:    Fever over 100.4 F (38 C) after 2 days of taking antibiotics    Symptoms that get worse or don't get better in a few days    Trouble breathing or swallowing  Date Last Reviewed: 10/1/2017    4880-9742 [employer]. [address] [place] [address]. All rights reserved. This information is not intended as a substitute for professional medical care. Always follow your healthcare professional's instructions.           Patient Education     Salivary Gland Swelling, Uncertain Cause  Salivary glands make saliva in response to food in your mouth. Saliva is mostly water. It also has minerals and proteins that help break down food and keep the mouth and teeth healthy. There are three pairs of salivary glands:    Parotid glands (in front of the ear)    Submandibular glands (below the jaw)    Sublingual glands (below the tongue)  Each gland has a duct (channel) that carries saliva from the gland into the mouth.   Swelling of the salivary glands can sometimes occur. Causes can include:    Viral infection (such as childhood mumps)    Bacterial infections    Sjögren's syndrome    Diabetes    Malnutrition    Sarcoidosis    Blockage of the salivary duct (from stones or tumors)  Certain medicines can affect salivary flow. This can lead to swelling of the gland. Be sure to tell your healthcare provider about all of the medicines you take.  Tests are being done to determine the cause of the swelling. These may include blood tests, X-ray, ultrasound, CT scan, or injection of dye into the duct to look for blockage. Treatment depends on the exact cause of the swelling.  Home care    If the area is painful, you can take over-the-counter medicines, such as acetaminophen or  ibuprofen, unless you were prescribed another medicine. Wetting a cloth with warm water and putting it over the affected gland for 10-15 minutes at a time can also help ease pain.    To help prevent blockages and infections:  ? Drink 6-8 glasses of fluid per day (such as water, tea, and clear soup) to keep well hydrated.  ? If you smoke, ask your healthcare provider for help to quit. Smoking makes salivary gland stones more likely.  ? Maintain good dental hygiene. Brush and floss your teeth daily. See your dentist for regular cleanings.  Follow-up care  Follow up with your healthcare provider or as advised. See your healthcare provider for further exams and testing. If you have been referred to a specialist, make an appointment promptly.  When to seek medical advice  Call your healthcare provider if any of the following occur:    Increasing pain or swelling in the gland    Inability to open mouth or pain when opening mouth    Fever of 100.4 F (38 C) or higher, or as directed by your healthcare provider    Redness over the gland    Pus draining into the mouth    Trouble breathing or swallowing    Any new symptoms  Prevention  Here are steps you can take to help prevent an infection:    Keep good hand washing habits.    Don t have close contact with people who have sore throats, colds, or other upper respiratory infections.    Don t smoke, and stay away from secondhand smoke.    Stay up to date with of your vaccines.  Date Last Reviewed: 11/1/2017 2000-2019 [employer]. [address] [place] [address], [place] [address]. All rights reserved. This information is not intended as a substitute for professional medical care. Always follow your healthcare professional's instructions.

## 2020-07-15 NOTE — PATIENT INSTRUCTIONS
augmentin 2 x day for 1 week   Naprosyn 500 mg 2 x day with food    Referral Otolaryngology       Patient Education     Salivary Gland Stones  Salivary glands make saliva. Saliva is mostly water. It also has minerals and proteins that help break down food and keep the mouth and teeth healthy. There are 3 pairs of salivary glands:    Parotid glands (in front of the ear)    Submandibular glands (below the jaw)    Sublingual glands (below the tongue)  Each gland has a tube (duct channel) that lets saliva flow from the gland to the mouth. A salivary gland stone can form as a result of poor salivary flow. This lets minerals build up and create a stone. When a stone forms, it blocks the flow of saliva. The gland swells and becomes painful. Symptoms may be worse when eating. This is because food stimulates the flow of saliva.  A blocked salivary gland may become infected. This can cause worsened pain, redness over the gland, and fever.  Tests that help diagnose a salivary gland stone include CT-scan, X-ray, ultrasound, or injection of dye into the salivary duct. A stone may be removed or allowed to pass on its own.  Home care    Unless another medicine was prescribed, take over-the-counter medicines, such as acetaminophen or ibuprofen, to help relieve pain.    Moist heat can also help relieve pain. Wet a cloth with warm water and put it over the affected gland for 10-15 minutes several times a day.    Gently massage the gland a few times a day.     Suck on lemon or other tart hard candies to cause flow of saliva.    To help prevent future stones:  ? Drink 6-8 glasses of fluid per day (such as water, tea, and clear soup) to keep well hydrated.  ? If you smoke, ask your healthcare provider for help to quit. Smoking makes salivary gland stones more likely.  ? Keep good dental hygiene. Brush and floss your teeth daily. See your dentist for regular cleanings.    Follow-up care  Follow up with your healthcare provider or as  "advised.  When to seek medical advice  Call your healthcare provider if any of the following occur:    Pain or swelling in the gland that gets worse    Can't open mouth or pain when opening mouth    Fever of 100.4 F (38 C) or higher, or as directed by your healthcare provider    Redness over the sore gland    Pus draining into the mouth    Trouble swallowing or breathing  Date Last Reviewed: 10/1/2017    [id number] [employer]. [address] [place] [address], [place] [address]. All rights reserved. This information is not intended as a substitute for professional medical care. Always follow your healthcare professional's instructions.           Patient Education     Salivary Gland Infection  Salivary glands make saliva. Saliva is mostly water. It also has minerals and proteins that help break down food and keep the mouth and teeth healthy. There are 3 pairs of salivary glands:    Parotid glands (in front of the ear)    Submandibular glands (below the jaw)    Sublingual glands (below the tongue)  A salivary gland can become infected by bacteria (germs). Things that make this more likely include dehydration and taking medicines that affect saliva flow. Infection is also more likely when the tube (duct) that carries saliva from the gland to the mouth is blocked. It may be blocked by a salivary gland \"stone.\" This is a collection of minerals that forms in the salivary gland.  Signs of infection include fever, severe pain in the gland, and redness and swelling over the gland. It may hurt to open the mouth. Symptoms may be worse when the flow of saliva is stimulated, such as by the smell of food.   Antibiotics are used to treat the infection. Drainage of the infection with a simple surgery may be needed. If you have a salivary gland stone, a procedure may be done to remove it.  Home Care:    Take antibiotics as directed until they are finished. Do this even if you start to feel better after only a few days.    Unless " another medicine was prescribed, take over-the-counter medicines, such as acetaminophen or ibuprofen, to help relieve pain.    Moist heat can also help relieve swelling and pain. Wet a cloth with warm water and put it over the sore gland for 10-15 minutes several times a day.    Gently massage the gland a few times a day.     Suck on lemon or other tart hard candies to cause flow of saliva.  To help prevent stones and infections:    Drink 6-8 glasses of fluid per day (such as water, tea, and clear soup) to keep well hydrated.    If you smoke, ask your healthcare provider for help to quit. Smoking makes salivary gland stones more likely.    Keep good dental hygiene. Brush and floss your teeth daily. See your dentist for regular cleanings.  Follow-up care  Follow up with your healthcare provider or as advised.   When to seek medical advice  Call your healthcare provider if any of the following occur:    Fever over 100.4 F (38 C) after 2 days of taking antibiotics    Symptoms that get worse or don't get better in a few days    Trouble breathing or swallowing  Date Last Reviewed: 10/1/2017    [id number] [employer]. [address] [place] [address]. All rights reserved. This information is not intended as a substitute for professional medical care. Always follow your healthcare professional's instructions.           Patient Education     Salivary Gland Swelling, Uncertain Cause  Salivary glands make saliva in response to food in your mouth. Saliva is mostly water. It also has minerals and proteins that help break down food and keep the mouth and teeth healthy. There are three pairs of salivary glands:    Parotid glands (in front of the ear)    Submandibular glands (below the jaw)    Sublingual glands (below the tongue)  Each gland has a duct (channel) that carries saliva from the gland into the mouth.   Swelling of the salivary glands can sometimes occur. Causes can include:    Viral infection  (such as childhood mumps)    Bacterial infections    Sjögren's syndrome    Diabetes    Malnutrition    Sarcoidosis    Blockage of the salivary duct (from stones or tumors)  Certain medicines can affect salivary flow. This can lead to swelling of the gland. Be sure to tell your healthcare provider about all of the medicines you take.  Tests are being done to determine the cause of the swelling. These may include blood tests, X-ray, ultrasound, CT scan, or injection of dye into the duct to look for blockage. Treatment depends on the exact cause of the swelling.  Home care    If the area is painful, you can take over-the-counter medicines, such as acetaminophen or ibuprofen, unless you were prescribed another medicine. Wetting a cloth with warm water and putting it over the affected gland for 10-15 minutes at a time can also help ease pain.    To help prevent blockages and infections:  ? Drink 6-8 glasses of fluid per day (such as water, tea, and clear soup) to keep well hydrated.  ? If you smoke, ask your healthcare provider for help to quit. Smoking makes salivary gland stones more likely.  ? Maintain good dental hygiene. Brush and floss your teeth daily. See your dentist for regular cleanings.  Follow-up care  Follow up with your healthcare provider or as advised. See your healthcare provider for further exams and testing. If you have been referred to a specialist, make an appointment promptly.  When to seek medical advice  Call your healthcare provider if any of the following occur:    Increasing pain or swelling in the gland    Inability to open mouth or pain when opening mouth    Fever of 100.4 F (38 C) or higher, or as directed by your healthcare provider    Redness over the gland    Pus draining into the mouth    Trouble breathing or swallowing    Any new symptoms  Prevention  Here are steps you can take to help prevent an infection:    Keep good hand washing habits.    Don t have close contact with people who  have sore throats, colds, or other upper respiratory infections.    Don t smoke, and stay away from secondhand smoke.    Stay up to date with of your vaccines.  Date Last Reviewed: 11/1/2017 2000-2019 [employer]. [address] [place] [address], [place] [address]. All rights reserved. This information is not intended as a substitute for professional medical care. Always follow your healthcare professional's instructions.

## 2020-07-17 ENCOUNTER — ALLIED HEALTH/NURSE VISIT (OUTPATIENT)
Dept: NURSING | Facility: CLINIC | Age: 60
End: 2020-07-17
Payer: COMMERCIAL

## 2020-07-17 VITALS — DIASTOLIC BLOOD PRESSURE: 74 MMHG | SYSTOLIC BLOOD PRESSURE: 124 MMHG

## 2020-07-17 DIAGNOSIS — Z01.30 BP CHECK: Primary | ICD-10-CM

## 2020-07-17 PROCEDURE — 99207 ZZC NO CHARGE NURSE ONLY: CPT

## 2020-07-23 ENCOUNTER — TELEPHONE (OUTPATIENT)
Dept: CARDIOLOGY | Facility: CLINIC | Age: 60
End: 2020-07-23

## 2020-07-23 NOTE — TELEPHONE ENCOUNTER

## 2020-07-24 ENCOUNTER — HOSPITAL ENCOUNTER (OUTPATIENT)
Dept: CARDIOLOGY | Facility: CLINIC | Age: 60
Discharge: HOME | End: 2020-07-24
Attending: INTERNAL MEDICINE | Admitting: INTERNAL MEDICINE
Payer: COMMERCIAL

## 2020-07-24 DIAGNOSIS — R60.9 EDEMA, UNSPECIFIED TYPE: ICD-10-CM

## 2020-07-24 DIAGNOSIS — I10 BENIGN ESSENTIAL HYPERTENSION: ICD-10-CM

## 2020-07-24 LAB
ANION GAP SERPL CALCULATED.3IONS-SCNC: ABNORMAL MMOL/L (ref 6–17)
BUN SERPL-MCNC: 17 MG/DL (ref 7–30)
CALCIUM SERPL-MCNC: 9.4 MG/DL (ref 8.5–10.5)
CHLORIDE SERPL-SCNC: 105 MMOL/L (ref 98–107)
CO2 SERPL-SCNC: 28 MMOL/L (ref 23–29)
CREAT SERPL-MCNC: 1.1 MG/DL (ref 0.7–1.3)
GFR SERPL CREATININE-BSD FRML MDRD: 51 ML/MIN/{1.73_M2}
GLUCOSE SERPL-MCNC: 154 MG/DL (ref 70–105)
POTASSIUM SERPL-SCNC: 2.7 MMOL/L (ref 3.5–5.1)
SODIUM SERPL-SCNC: 140 MMOL/L (ref 136–145)

## 2020-07-24 PROCEDURE — 36415 COLL VENOUS BLD VENIPUNCTURE: CPT | Performed by: INTERNAL MEDICINE

## 2020-07-24 PROCEDURE — 80048 BASIC METABOLIC PNL TOTAL CA: CPT | Performed by: INTERNAL MEDICINE

## 2020-07-24 PROCEDURE — 93306 TTE W/DOPPLER COMPLETE: CPT | Mod: 26 | Performed by: INTERNAL MEDICINE

## 2020-07-24 PROCEDURE — 93306 TTE W/DOPPLER COMPLETE: CPT

## 2020-07-28 ENCOUNTER — PATIENT OUTREACH (OUTPATIENT)
Dept: CARDIOLOGY | Facility: CLINIC | Age: 60
End: 2020-07-28

## 2020-07-28 ENCOUNTER — CARE COORDINATION (OUTPATIENT)
Dept: CARDIOLOGY | Facility: CLINIC | Age: 60
End: 2020-07-28

## 2020-07-28 NOTE — PROGRESS NOTES
Referral- Heart Failure    REFERRING CLINIC INFORMATION:    Referring Clinic: [facility]  Referring Provider: Dr. Benton    [facility] NOTES:       July 28, 2020  Received a message from Eladio Benton's nurse had contacted him regarding referral and Eladio called her but she did not want to schedule with us at this time. Eladio gave her the scheduling number if she should change her mind.     July 29, 2020 We are trying to contact her again with Dr. Morton to see if she will change her mind as it seemed urgent to get her in. Will wait and see if that conversation changes her mind.     August 3, 2020 - Dr. Morton tried to reach patient 3-4 times without success. I messaged Dane so he and Dr. Benton knew what had happened. Will await any further instructions, if none will close encounter.     Donny Douglass, [facility]  Heart Failure, Advanced Heart Failure & CORE  Referral Specialist &     [facility]  Cardiology  Office: [phone]  [phone]

## 2020-07-28 NOTE — TELEPHONE ENCOUNTER
Patient is referred for heart failure evaluation and treatment related to recent echo result showing EF of 25%-30%. Patient was called and offered different appointment times. Patient states she is busy and needs to check her calendar to decide. She also stated reluctance to be medically treated for her heart failure stating she has had bad experiences with previous medications, including dizziness and joint inflammation. Patient encouraged to make appointment to discuss options for treatment. Patient was given scheduling number and will notify referral staff.

## 2020-08-05 NOTE — PROGRESS NOTES
Spoke to patient.     She says she is going to see her primary first then decide if she's going to get a second opinion from another cardiologist or she may decide to make a consult appt with Praveen. I stressed the importance of follow up.  She said she will call back next week to follow up after her appt with her PMD.

## 2020-08-13 ENCOUNTER — OFFICE VISIT (OUTPATIENT)
Dept: FAMILY MEDICINE | Facility: CLINIC | Age: 60
End: 2020-08-13
Payer: COMMERCIAL

## 2020-08-13 VITALS
HEIGHT: 65 IN | RESPIRATION RATE: 16 BRPM | SYSTOLIC BLOOD PRESSURE: 118 MMHG | OXYGEN SATURATION: 98 % | BODY MASS INDEX: 30.82 KG/M2 | WEIGHT: 185 LBS | HEART RATE: 84 BPM | DIASTOLIC BLOOD PRESSURE: 76 MMHG | TEMPERATURE: 97.7 F

## 2020-08-13 DIAGNOSIS — Z12.11 SCREENING FOR COLON CANCER: ICD-10-CM

## 2020-08-13 DIAGNOSIS — Z12.4 SCREENING FOR CERVICAL CANCER: ICD-10-CM

## 2020-08-13 DIAGNOSIS — I50.9 ACUTE ON CHRONIC CONGESTIVE HEART FAILURE, UNSPECIFIED HEART FAILURE TYPE (H): ICD-10-CM

## 2020-08-13 DIAGNOSIS — Z23 NEED FOR VACCINATION: ICD-10-CM

## 2020-08-13 DIAGNOSIS — Z00.00 ROUTINE GENERAL MEDICAL EXAMINATION AT A HEALTH CARE FACILITY: Primary | ICD-10-CM

## 2020-08-13 DIAGNOSIS — I10 BENIGN ESSENTIAL HYPERTENSION: ICD-10-CM

## 2020-08-13 DIAGNOSIS — Z12.39 SCREENING FOR BREAST CANCER: ICD-10-CM

## 2020-08-13 DIAGNOSIS — E87.6 HYPOKALEMIA: ICD-10-CM

## 2020-08-13 PROCEDURE — 90471 IMMUNIZATION ADMIN: CPT | Performed by: NURSE PRACTITIONER

## 2020-08-13 PROCEDURE — 80048 BASIC METABOLIC PNL TOTAL CA: CPT | Performed by: NURSE PRACTITIONER

## 2020-08-13 PROCEDURE — 36415 COLL VENOUS BLD VENIPUNCTURE: CPT | Performed by: NURSE PRACTITIONER

## 2020-08-13 PROCEDURE — 90732 PPSV23 VACC 2 YRS+ SUBQ/IM: CPT | Performed by: NURSE PRACTITIONER

## 2020-08-13 PROCEDURE — 99214 OFFICE O/P EST MOD 30 MIN: CPT | Mod: 25 | Performed by: NURSE PRACTITIONER

## 2020-08-13 PROCEDURE — 99396 PREV VISIT EST AGE 40-64: CPT | Mod: 25 | Performed by: NURSE PRACTITIONER

## 2020-08-13 SDOH — HEALTH STABILITY: MENTAL HEALTH: HOW OFTEN DO YOU HAVE A DRINK CONTAINING ALCOHOL?: NEVER

## 2020-08-13 ASSESSMENT — MIFFLIN-ST. JEOR: SCORE: 1402.09

## 2020-08-13 NOTE — PATIENT INSTRUCTIONS
Cardiologist: Dr. Lupe Quinonez            Preventive Health Recommendations  Female Ages 50 - 64    Yearly exam: See your health care provider every year in order to  o Review health changes.   o Discuss preventive care.    o Review your medicines if your doctor has prescribed any.      Get a Pap test every three years (unless you have an abnormal result and your provider advises testing more often).    If you get Pap tests with HPV test, you only need to test every 5 years, unless you have an abnormal result.     You do not need a Pap test if your uterus was removed (hysterectomy) and you have not had cancer.    You should be tested each year for STDs (sexually transmitted diseases) if you're at risk.     Have a mammogram every 1 to 2 years.    Have a colonoscopy at age 50, or have a yearly FIT test (stool test). These exams screen for colon cancer.      Have a cholesterol test every 5 years, or more often if advised.    Have a diabetes test (fasting glucose) every three years. If you are at risk for diabetes, you should have this test more often.     If you are at risk for osteoporosis (brittle bone disease), think about having a bone density scan (DEXA).    Shots: Get a flu shot each year. Get a tetanus shot every 10 years.    Nutrition:     Eat at least 5 servings of fruits and vegetables each day.    Eat whole-grain bread, whole-wheat pasta and brown rice instead of white grains and rice.    Get adequate Calcium and Vitamin D.     Lifestyle    Exercise at least 150 minutes a week (30 minutes a day, 5 days a week). This will help you control your weight and prevent disease.    Limit alcohol to one drink per day.    No smoking.     Wear sunscreen to prevent skin cancer.     See your dentist every six months for an exam and cleaning.    See your eye doctor every 1 to 2 years.

## 2020-08-13 NOTE — LETTER
August 17, 2020      [address]  [address]   [facility] [address]        Dear Ms[address],    We are writing to inform you of your test results.    This note is to let you know that your lab results look good. As we discussesd at your appointment, your potassium was low a few weeks ago. It has now returned to normal. I do recommend that you continue to eat a diet rich in potassium sources.     Let me know if you have any questions or concerns.     Resulted Orders   Basic metabolic panel   Result Value Ref Range    Sodium 139 133 - 144 mmol/L    Potassium 3.6 3.4 - 5.3 mmol/L    Chloride 107 94 - 109 mmol/L    Carbon Dioxide 23 20 - 32 mmol/L    Anion Gap 9 3 - 14 mmol/L    Glucose 75 70 - 99 mg/dL      Comment:      Non Fasting    Urea Nitrogen 19 7 - 30 mg/dL    Creatinine 0.90 0.52 - 1.04 mg/dL    GFR Estimate 69 >60 mL/min/[1.73_m2]      Comment:      Non  GFR Calc  Starting 12/18/2018, serum creatinine based estimated GFR (eGFR) will be   calculated using the Chronic Kidney Disease Epidemiology Collaboration   (CKD-EPI) equation.      GFR Estimate If Black 80 >60 mL/min/[1.73_m2]      Comment:       GFR Calc  Starting 12/18/2018, serum creatinine based estimated GFR (eGFR) will be   calculated using the Chronic Kidney Disease Epidemiology Collaboration   (CKD-EPI) equation.      Calcium 9.5 8.5 - 10.1 mg/dL       If you have any questions or concerns, please call the clinic at the number listed above.       Sincerely,        RALPH Swann CNP/nr

## 2020-08-13 NOTE — PROGRESS NOTES
SUBJECTIVE:   CC: Rayo Street is an 59 year old woman who presents for preventive health visit.     Healthy Habits:    Do you get at least three servings of calcium containing foods daily (dairy, green leafy vegetables, etc.)? yes    Amount of exercise or daily activities, outside of work: 3 day(s) per week    Problems taking medications regularly No    Medication side effects: No    Have you had an eye exam in the past two years? no    Do you see a dentist twice per year? no    Do you have sleep apnea, excessive snoring or daytime drowsiness?no    Today's PHQ-2 Score:   PHQ-2 ( 1999 [employer]) 8/13/2020 6/16/2020   Q1: Little interest or pleasure in doing things 0 0   Q2: Feeling down, depressed or hopeless 0 0   PHQ-2 Score 0 0       Abuse: Current or Past(Physical, Sexual or Emotional)- No  Do you feel safe in your environment? Yes    Have you ever done Advance Care Planning? (For example, a Health Directive, POLST, or a discussion with a medical provider or your loved ones about your wishes): No, advance care planning information given to patient to review.  Patient plans to discuss their wishes with loved ones or provider.      Social History     Tobacco Use     Smoking status: Never Smoker     Smokeless tobacco: Never Used   Substance Use Topics     Alcohol use: Never     Frequency: Never     If you drink alcohol do you typically have >3 drinks per day or >7 drinks per week? No                     Reviewed orders with patient.  Reviewed health maintenance and updated orders accordingly - Yes  Lab work is in process  Labs reviewed in EPIC  BP Readings from Last 3 Encounters:   08/13/20 118/76   07/17/20 124/74   07/15/20 138/84    Wt Readings from Last 3 Encounters:   08/13/20 83.9 kg (185 lb)   07/15/20 83.5 kg (184 lb)   06/12/20 84.8 kg (187 lb)              Patient Active Problem List   Diagnosis     Benign essential hypertension     Acute on chronic congestive heart failure, unspecified heart  failure type (H)     Edema, unspecified type     History reviewed. No pertinent surgical history.    Social History     Tobacco Use     Smoking status: Never Smoker     Smokeless tobacco: Never Used   Substance Use Topics     Alcohol use: Never     Frequency: Never     History reviewed. No pertinent family history.      Current Outpatient Medications   Medication Sig Dispense Refill     amLODIPine (NORVASC) 5 MG tablet Take 1 tablet (5 mg) by mouth daily 90 tablet 1     fluticasone (FLONASE) 50 MCG/ACT nasal spray Spray 2 sprays into both nostrils daily 18 mL 3     hydrochlorothiazide (HYDRODIURIL) 12.5 MG tablet Take 1 tablet (12.5 mg) by mouth daily 90 tablet 1     No Known Allergies  Recent Labs   Lab Test 07/24/20  1430 06/12/20  1233   LDL  --  118*   HDL  --  59   TRIG  --  145   ALT  --  43   CR 1.10 0.88   GFRESTIMATED 51* 71   GFRESTBLACK 61 83   POTASSIUM 2.7* 4.3   TSH  --  0.76        Mammogram Screening: Patient over age 50, mutual decision to screen reflected in health maintenance.    Pertinent mammograms are reviewed under the imaging tab.  History of abnormal Pap smear: Last 3 Pap and HPV Results:       Reviewed and updated as needed this visit by clinical staff  Tobacco  Allergies  Meds  Med Hx  Surg Hx  Fam Hx  Soc Hx        Reviewed and updated as needed this visit by Provider          Media is seeing me today to establish care.    Heart failure:  Taking her meds as prescribed.  She has seen cardiology recently, but she is requesting to follow up with a female cardiologist.  She has had a tricky time with her heart medications.  She states that she knows she has a bad heart, but she wants to have a quality life.  She is not overly excited about being on the medications for heart failure.  Today she denies any chest pain or shortness of breath.  She denies any swelling in her ankles and feet.    Potassium:  At her last hospitalization, her potassium level was low.    Trying to get potassium  "rich foods.  \"I'm not sure what else I can eat to make my potassium better.\"    Routine health maintenance items:    Colonoscopy:  Was done a couple of years ago, out of town.  She reports that report was normal and that she does not need a colonoscopy for another 10 years.    Blood pressure/hypertension:  She is taking her amlodipine and hydrochlorothiazide as prescribed.  She denies any side effects or problems.    Mammogram:  Is due now.  She denies any breast abnormalities, lumps etc.  She has never had an abnormal mammogram.    Vaccines:  She is not sure what she is due today.    Pap smear:  Is due today.    She is declining a Pap smear, she does not want to do it.      ROS:  CONSTITUTIONAL: NEGATIVE for fever, chills, change in weight  INTEGUMENTARU/SKIN: NEGATIVE for worrisome rashes, moles or lesions  EYES: NEGATIVE for vision changes or irritation  ENT: NEGATIVE for ear, mouth and throat problems  RESP: NEGATIVE for significant cough or SOB  BREAST: NEGATIVE for masses, tenderness or discharge  CV: NEGATIVE for chest pain, palpitations or peripheral edema  GI: NEGATIVE for nausea, abdominal pain, heartburn, or change in bowel habits  : NEGATIVE for unusual urinary or vaginal symptoms. Periods are regular.  MUSCULOSKELETAL: NEGATIVE for significant arthralgias or myalgia  NEURO: NEGATIVE for weakness, dizziness or paresthesias  PSYCHIATRIC: NEGATIVE for changes in mood or affect    OBJECTIVE:   /76 (BP Location: Right arm, Patient Position: Sitting, Cuff Size: Adult Regular)   Pulse 84   Temp 97.7  F (36.5  C) (Tympanic)   Resp 16   Ht 1.638 m (5' 4.5\")   Wt 83.9 kg (185 lb)   SpO2 98%   BMI 31.26 kg/m    EXAM:  GENERAL: healthy, alert and no distress  EYES: Eyes grossly normal to inspection, PERRL and conjunctivae and sclerae normal  HENT: ear canals and TM's normal, nose and mouth without ulcers or lesions  NECK: no adenopathy, no asymmetry, masses, or scars and thyroid normal to " "palpation  RESP: lungs clear to auscultation - no rales, rhonchi or wheezes  BREAST: normal without masses, tenderness or nipple discharge and no palpable axillary masses or adenopathy  CV: regular rate and rhythm, normal S1 S2, no S3 or S4, no murmur, click or rub, no peripheral edema and peripheral pulses strong  ABDOMEN: soft, nontender, no hepatosplenomegaly, no masses and bowel sounds normal  MS: no gross musculoskeletal defects noted, no edema  SKIN: no suspicious lesions or rashes  NEURO: Normal strength and tone, mentation intact and speech normal  PSYCH: mentation appears normal, affect normal/bright    Diagnostic Test Results:  Labs reviewed in Epic  Labs ordered, results pending      ASSESSMENT/PLAN:   (Z00.00) Routine general medical examination at a health care facility  (primary encounter diagnosis)  Comment:   Plan:     (E87.6) Hypokalemia  Comment: History of  Plan: Basic metabolic panel       I had a long discussion with the patient today regarding her history of CHF, hypertension, her medications, her recent hospitalization, follow-up with cardiology,  Her low potassium level etc.  Per the patient, she is not quite sure how aggressive she is going to be with her health.  She states that today she feels good but \" when it is my time to die, it is my time to die.\"  Her potassium level/labs were drawn today.  In the event that those levels come back low, she will be treated and she verbalized understanding.  In the meantime, she is to eat a potassium rich diet including but not limited to potatoes, tomatoes, bananas etc.  I did tell her that a comprehensive list of potassium rich foods can be found on the Internet.  Next primary care appointment in 3 months for recheck, sooner if any problems.    (I50.9) Acute on chronic congestive heart failure, unspecified heart failure type (H)  Comment: Chronic  Plan: ACE/ARB/ARNI NOT PRESCRIBED (INTENTIONAL), BETA        BLOCKER NOT PRESCRIBED (INTENTIONAL), " "Basic         metabolic panel        I reviewed with the patient her recent ejection fraction, cardiology, the role of cardiology in her care, and the importance of following up with a cardiologist.  For today, she will continue her medications and she does states she will follow-up with cardiology.    (I10) Benign essential hypertension  Comment:  Controlled today  For now, she is to continue her medication regimen.  Next follow-up appointment in 3 months with primary care, sooner as needed.    (Z12.4) Screening for cervical cancer  Comment: due  Plan: The patient declined a Pap smear today.  She states she does not want to do it and she does not need it.  I did encourage her to do a Pap smear at the next appointment.     (Z12.39) Screening for breast cancer  Comment: Routine  Plan: MA Screening Digital Bilateral        Routine screening colonoscopy at earliest convenience    (Z12.11) Screening for colon cancer  Comment: Routine  Plan: GASTROENTEROLOGY ADULT REF PROCEDURE ONLY        I did tell the patient that we do not have any colon cancer screening reports on hand.  I did give her a referral for a colonoscopy in the event that she decided to go forward again at this time.  Otherwise, she will forward her last colonoscopy report to our clinic.    (Z23) Need for vaccination  Comment: Routine  Plan: PPSV23, IM/SUBQ (2+ YRS) - [id number], ADMIN         1st VACCINE        She did agree to a pneumococcal vaccine today.      COUNSELING:   Reviewed preventive health counseling, as reflected in patient instructions    Estimated body mass index is 31.26 kg/m  as calculated from the following:    Height as of this encounter: 1.638 m (5' 4.5\").    Weight as of this encounter: 83.9 kg (185 lb).    Weight management plan: Discussed healthy diet and exercise guidelines     reports that she has never smoked. She has never used smokeless tobacco.      Counseling Resources:  ATP IV Guidelines  Pooled Cohorts Equation " Calculator  Breast Cancer Risk Calculator  FRAX Risk Assessment  ICSI Preventive Guidelines  Dietary Guidelines for Americans, 2010  USDA's MyPlate  ASA Prophylaxis  Lung CA Screening    RALPH Swann CNP  [facility]

## 2020-08-13 NOTE — NURSING NOTE
Prior to immunization administration, verified patients identity using patient s name and date of birth. Please see Immunization Activity for additional information.     Screening Questionnaire for Adult Immunization    Are you sick today?   No   Do you have allergies to medications, food, a vaccine component or latex?   No   Have you ever had a serious reaction after receiving a vaccination?   No   Do you have a long-term health problem with heart, lung, kidney, or metabolic disease (e.g., diabetes), asthma, a blood disorder, no spleen, complement component deficiency, a cochlear implant, or a spinal fluid leak?  Are you on long-term aspirin therapy?   No   Do you have cancer, leukemia, HIV/AIDS, or any other immune system problem?   No   Do you have a parent, brother, or sister with an immune system problem?   No   In the past 3 months, have you taken medications that affect  your immune system, such as prednisone, other steroids, or anticancer drugs; drugs for the treatment of rheumatoid arthritis, Crohn s disease, or psoriasis; or have you had radiation treatments?   No   Have you had a seizure, or a brain or other nervous system problem?   No   During the past year, have you received a transfusion of blood or blood    products, or been given immune (gamma) globulin or antiviral drug?   No   For women: Are you pregnant or is there a chance you could become       pregnant during the next month?   No   Have you received any vaccinations in the past 4 weeks?   No     Immunization questionnaire answers were all negative.        Per orders of Dr. King, injection of Pneumo-23 given by Yoly Sullivan MA. Patient instructed to remain in clinic for 15 minutes afterwards, and to report any adverse reaction to me immediately.       Screening performed by Yoly Sullivan MA on 8/13/2020 at 3:28 PM.

## 2020-08-14 LAB
ANION GAP SERPL CALCULATED.3IONS-SCNC: 9 MMOL/L (ref 3–14)
BUN SERPL-MCNC: 19 MG/DL (ref 7–30)
CALCIUM SERPL-MCNC: 9.5 MG/DL (ref 8.5–10.1)
CHLORIDE SERPL-SCNC: 107 MMOL/L (ref 94–109)
CO2 SERPL-SCNC: 23 MMOL/L (ref 20–32)
CREAT SERPL-MCNC: 0.9 MG/DL (ref 0.52–1.04)
GFR SERPL CREATININE-BSD FRML MDRD: 69 ML/MIN/{1.73_M2}
GLUCOSE SERPL-MCNC: 75 MG/DL (ref 70–99)
POTASSIUM SERPL-SCNC: 3.6 MMOL/L (ref 3.4–5.3)
SODIUM SERPL-SCNC: 139 MMOL/L (ref 133–144)

## 2020-08-21 ENCOUNTER — TELEPHONE (OUTPATIENT)
Dept: CARDIOLOGY | Facility: CLINIC | Age: 60
End: 2020-08-21

## 2020-08-21 NOTE — TELEPHONE ENCOUNTER
Health Call Center    Phone Message    May a detailed message be left on voicemail: yes     Reason for Call: Other: Media calling to schedule an appointment with Dr. Ga Quinonez. Rayo has Benign essential hypertension, but clinic protocols do not show that Dr. ga Quinonez sees for this diagnosis. Media would like to chose a female doctor, and this is someone that her primary, Brenda King, had recommended. Media would like to request referral from Dr. Bowden to see if she can be seen by Dr. Ga Quinonez instead. Please give Media a call back with any questions.     Action Taken: Message routed to:  [facility] ([facility]):  Cardio    Travel Screening: Not Applicable

## 2020-08-25 ENCOUNTER — ALLIED HEALTH/NURSE VISIT (OUTPATIENT)
Dept: NURSING | Facility: CLINIC | Age: 60
End: 2020-08-25
Payer: COMMERCIAL

## 2020-08-25 DIAGNOSIS — Z23 ENCOUNTER FOR IMMUNIZATION: Primary | ICD-10-CM

## 2020-08-25 PROCEDURE — 90715 TDAP VACCINE 7 YRS/> IM: CPT

## 2020-08-25 PROCEDURE — 90471 IMMUNIZATION ADMIN: CPT

## 2020-08-25 NOTE — NURSING NOTE
Prior to immunization administration, verified patients identity using patient s name and date of birth. Please see Immunization Activity for additional information.     Screening Questionnaire for Adult Immunization    Are you sick today?   No   Do you have allergies to medications, food, a vaccine component or latex?   No   Have you ever had a serious reaction after receiving a vaccination?   No   Do you have a long-term health problem with heart, lung, kidney, or metabolic disease (e.g., diabetes), asthma, a blood disorder, no spleen, complement component deficiency, a cochlear implant, or a spinal fluid leak?  Are you on long-term aspirin therapy?   No   Do you have cancer, leukemia, HIV/AIDS, or any other immune system problem?   No   Do you have a parent, brother, or sister with an immune system problem?   No   In the past 3 months, have you taken medications that affect  your immune system, such as prednisone, other steroids, or anticancer drugs; drugs for the treatment of rheumatoid arthritis, Crohn s disease, or psoriasis; or have you had radiation treatments?   No   Have you had a seizure, or a brain or other nervous system problem?   No   During the past year, have you received a transfusion of blood or blood    products, or been given immune (gamma) globulin or antiviral drug?   No   For women: Are you pregnant or is there a chance you could become       pregnant during the next month?   No   Have you received any vaccinations in the past 4 weeks?   Yes     Immunization questionnaire was positive for at least one answer.  Notified Dr Virgen.        Per orders of Dr. Calderón, injection of TDAP given by Jazzy Lopez. Patient instructed to remain in clinic for 15 minutes afterwards, and to report any adverse reaction to me immediately.       Screening performed by Jazzy Lopez on 8/25/2020 at 3:43 PM.

## 2020-09-11 NOTE — PROGRESS NOTES
"Rayo Street is a 59 year old female who is being evaluated via a billable telephone visit.      The patient has been notified of following:     \"This telephone visit will be conducted via a call between you and your physician/provider. We have found that certain health care needs can be provided without the need for a physical exam.  This service lets us provide the care you need with a short phone conversation.  If a prescription is necessary we can send it directly to your pharmacy.  If lab work is needed we can place an order for that and you can then stop by our lab to have the test done at a later time.    Telephone visits are billed at different rates depending on your insurance coverage. During this emergency period, for some insurers they may be billed the same as an in-person visit.  Please reach out to your insurance provider with any questions.    If during the course of the call the physician/provider feels a telephone visit is not appropriate, you will not be charged for this service.\"    Patient has given verbal consent for Telephone visit?  Yes    How would you like to obtain your AVS? Mail a copy    Phone call duration: 60  Minutes (phone call started at 1:30 PM)    HPI: Ms. Rayo Street is a 59 year old  female with PMH significant for heart failure with reduced ejection fraction since 2017.    Patient was recently seen by my colleague Dr. Benton and referred to heart failure clinic after being found to have a EF of 25 to 30%.    Patient moved to [place] from [place] end of  2016 and started a new job in [place].  End of 2016 she noticed shortness of breath with exertion and gaining weight.  She was subsequently seen in the ER and admitted to [facility] for 3 days.  She was found to have severely reduced ejection fraction at 25% by echocardiogram.  A nuclear stress test was performed which showed no evidence of ischemia.  Per patient's report she was started on multiple " medications at the same time.  She reports multiple side effects and she decided to come off the treatment.  Patient states that she stayed only on amlodipine for 3 years up until 6 months ago.  She decided to stop amlodipine 6 months ago.  In February of this year she started feeling similar SANTILLAN  and she was subsequently seen by PCP and cardiology.  She is currently taking amlodipine and hydrochlorothiazide.  Patient reports no chest pain, shortness of breath, dizziness, syncope, palpitations or lower extremity edema.  She is able to walk and do ADLs without difficulty.    Lifetime non-smoker.  No alcohol abuse.  No drug abuse.    No history of diabetes, hyperlipidemia or family history of heart failure/coronary artery disease. She was told be hypertensive when she was initially diagnosed with heart failure in 2017.    I have personally reviewed echocardiogram 7/24/2020 which shows LV dilation, LV hypertrophy and severely reduced LVEF of 25 to 30%.  No significant valve disease is noted.  IVC was not dilated.  Pulmonary hypertension was noted at 45 mmHg.    Patient is currently on amlodipine 5 mg and hydrochlorothiazide 12.5 mg    I have reviewed patient's most recent labs on 6/12/2020 which shows normal CBC, normal lipid profile and unremarkable BMP.  TSH is normal.  BNP is elevated at 2000.  HIV negative.  Hep C negative.  Albumin creatinine ratio elevated at 43mg/g.    EKG 6/12/2020 shows sinus rhythm left bundle branch block and left atrial abnormality.    Medications, personal, family, and social history reviewed with patient and revised.    PAST MEDICAL HISTORY:  No past medical history on file.    CURRENT MEDICATIONS:  Current Outpatient Medications   Medication Sig Dispense Refill     ACE/ARB/ARNI NOT PRESCRIBED (INTENTIONAL) Please choose reason not prescribed, below       amLODIPine (NORVASC) 5 MG tablet Take 1 tablet (5 mg) by mouth daily 90 tablet 1     BETA BLOCKER NOT PRESCRIBED (INTENTIONAL) Beta  Blocker not prescribed intentionally due to Refusal by patient       fluticasone (FLONASE) 50 MCG/ACT nasal spray Spray 2 sprays into both nostrils daily 18 mL 3     hydrochlorothiazide (HYDRODIURIL) 12.5 MG tablet Take 1 tablet (12.5 mg) by mouth daily 90 tablet 1     hydrochlorothiazide (MICROZIDE) 12.5 MG capsule Take 1 capsule by mouth once daily 90 capsule 1       PAST SURGICAL HISTORY:  No past surgical history on file.    ALLERGIES:   No Known Allergies    FAMILY HISTORY:  No family history on file.      SOCIAL HISTORY:  Social History     Tobacco Use     Smoking status: Never Smoker     Smokeless tobacco: Never Used   Substance Use Topics     Alcohol use: Never     Frequency: Never     Drug use: Never       ROS:   Constitutional: No fever, chills, or sweats. Weight stable.   ENT: No visual disturbance, ear ache, epistaxis, sore throat.   Cardiovascular: As per HPI.   Respiratory: No cough, hemoptysis.    GI: No nausea, vomiting, hematemesis, melena, or hematochezia.   : No hematuria.   Integument: Negative.   Psychiatric: Negative.   Hematologic:  No easy bruising, no easy bleeding.  Neuro: Negative.   Endocrinology: No significant heat or cold intolerance   Musculoskeletal: No myalgia.    Exam:  Physical Exam Elements attainable via telehealth:       Constitutional - alert and no distress    Eyes - no redness, no discharge    Respiratory - no cough, no labored breathing    Skin - no discoloration or lesions on the face or the arm.    Neurological -alert, normal speech,and affect, no tremor.     I have reviewed the labs and personally reviewed the imaging below and made my comment in the assessment and plan.    Labs:  CBC RESULTS:   Lab Results   Component Value Date    WBC 5.8 06/12/2020    RBC 5.05 06/12/2020    HGB 14.9 06/12/2020    HCT 45.1 06/12/2020    MCV 89 06/12/2020    MCH 29.5 06/12/2020    MCHC 33.0 06/12/2020    RDW 15.5 (H) 06/12/2020     06/12/2020       BMP RESULTS:  Lab Results    Component Value Date     08/13/2020    POTASSIUM 3.6 08/13/2020    CHLORIDE 107 08/13/2020    CO2 23 08/13/2020    ANIONGAP 9 08/13/2020    GLC 75 08/13/2020    BUN 19 08/13/2020    CR 0.90 08/13/2020    GFRESTIMATED 69 08/13/2020    GFRESTBLACK 80 08/13/2020    JAVIER 9.5 08/13/2020      Echocardiogram 7/24/2020  The left left ventricle is. There is moderate eccentric left ventricular hypertrophy.  The visual ejection fraction is estimated at 25-30%.  Diastolic Doppler findings (E/E' ratio and/or other parameters) suggest left  ventricular filling pressures are increased.  There is severe global hypokinesia of the left ventricle. Portions of the  inferior wall appear severely hypokinetic.  Moderate (46-55mmHg) pulmonary hypertension is present.  There is mild (1+) mitral regurgitation.  The inferior vena cava was normal in size with preserved respiratory  variability.     Echocardiogram 8/18/2017 [facility]  Left ventricle is moderately dilated  Normal LV wall thickness  Left ventricle systolic function severely reduced at EF of 22%  No regional wall motion abnormality  RV systolic function mildly reduced   RV systolic pressure estimated at 49 mmHg    Assessment and Plan:   Ms. Rayo Street is a 59 year old  female with PMH significant for heart failure with reduced ejection fraction since 2017.  The patient has not been on guideline directed medical therapy since she has been diagnosed with HFrEF over the last 3 years.  (Though she was initially started on heart failure treatment she was not able to tolerate them due to side effects and stopped all of those on her own.)  Only medication she has been on is amlodipine and recently hydrochlorothiazide.  Today I had a very lengthy discussion with the patient with regards to definition of heart failure, types of heart failure, etiology of heart failure and role of the medications.  The patient had multiple questions and she was initially  very resistant to change current treatment due to multiple drug reactions in the past.  After discussion for an hour or so she agreed to switch from amlodipine to angiotensin receptor blocker valsartan.  I have informed the patient that I will start low-dose and slowly increase the dose and we will consider starting her on beta-blocker during our next visits.  At this time patient's heart failure appears to be nonischemic.  I recommended cardiac MRI for further assessment.  Patient is agreeable to proceed.  I will start patient on valsartan 80 mg and discontinue amlodipine and hydrochlorothiazide.  Patient will continue to monitor BP at home and if she sees high blood pressure she will let us know.    Plan:  1.  Heart failure with reduced ejection fraction: New York Heart Association functional class I.  Discontinue amlodipine and hydrochlorothiazide.  Start valsartan 80 mg daily (patient recalls side effects from ACE inhibitor's and losartan in the past).  We will schedule cardiac MRI.    Return to clinic once the cardiac MRI result is available to discuss.    A total of  60 minutes spent through telephone encounter today with greater than 50% of the time spent in counseling and coordinating cares of the issues above.     Please donot hesitate to contact me if you have any questions or concerns. Again, thank you for allowing me to participate in the care of your patient.    Shaina SCHERER MD  [facility] Division of Cardiology  Pager [phone]

## 2020-09-14 ENCOUNTER — VIRTUAL VISIT (OUTPATIENT)
Dept: CARDIOLOGY | Facility: CLINIC | Age: 60
End: 2020-09-14
Attending: INTERNAL MEDICINE
Payer: COMMERCIAL

## 2020-09-14 DIAGNOSIS — I50.20 HEART FAILURE WITH REDUCED EJECTION FRACTION, NYHA CLASS I (H): Primary | ICD-10-CM

## 2020-09-14 PROCEDURE — 99215 OFFICE O/P EST HI 40 MIN: CPT | Mod: 95 | Performed by: INTERNAL MEDICINE

## 2020-09-14 RX ORDER — VALSARTAN 80 MG/1
80 TABLET ORAL DAILY
Qty: 30 TABLET | Refills: 1 | Status: SHIPPED | OUTPATIENT
Start: 2020-09-14 | End: 2021-06-04

## 2020-09-14 NOTE — PATIENT INSTRUCTIONS
1.  Discontinue amlodipine and hydrochlorothiazide.  Continue monitor blood pressure at home.  If you see your blood pressures above 140/90 mmHg please let us know.    2.  Start valsartan 80 mg once daily. This is specifically for your heart failure which will also control your blood pressure.  But we need to adjust the dose depending on how your blood pressure goes at home.    3.  Cardiac MRI will be scheduled at [facility].    4.  We will see you in clinic once the MRI result is available.

## 2020-09-14 NOTE — NURSING NOTE
"Chief Complaint   Patient presents with     Heart Problem     patient wants second opinion before accepting heart failure referral from [facility] - was referred to [facility] but she has not accepted.  Patient reports no cardiac concerns at this time.       Initial There were no vitals taken for this visit. Estimated body mass index is 31.26 kg/m  as calculated from the following:    Height as of 8/13/20: 1.638 m (5' 4.5\").    Weight as of 8/13/20: 83.9 kg (185 lb)..  Vitals - Patient Reported  Systolic (Patient Reported): 128  Diastolic (Patient Reported): 87  Weight (Patient Reported): 82.1 kg (181 lb)  Height (Patient Reported): 165.1 cm (5' 5\")  BMI (Based on Pt Reported Ht/Wt): 30.12  Pain Score: No Pain (0)      STEVIE Leon        "

## 2020-09-14 NOTE — LETTER
"9/14/2020      RE: Rayo Street  [address]  [facility] [address]       Dear Colleague,    Thank you for the opportunity to participate in the care of your patient, Rayo Street, at the [facility]. Please see a copy of my visit note below.    Rayo Street is a 59 year old female who is being evaluated via a billable telephone visit.      The patient has been notified of following:     \"This telephone visit will be conducted via a call between you and your physician/provider. We have found that certain health care needs can be provided without the need for a physical exam.  This service lets us provide the care you need with a short phone conversation.  If a prescription is necessary we can send it directly to your pharmacy.  If lab work is needed we can place an order for that and you can then stop by our lab to have the test done at a later time.    Telephone visits are billed at different rates depending on your insurance coverage. During this emergency period, for some insurers they may be billed the same as an in-person visit.  Please reach out to your insurance provider with any questions.    If during the course of the call the physician/provider feels a telephone visit is not appropriate, you will not be charged for this service.\"    Patient has given verbal consent for Telephone visit?  Yes    How would you like to obtain your AVS? Mail a copy    Phone call duration: 60  Minutes (phone call started at 1:30 PM)    HPI: Ms. Rayo Street is a 59 year old  female with PMH significant for heart failure with reduced ejection fraction since 2017.    Patient was recently seen by my colleague Dr. Benton and referred to heart failure clinic after being found to have a EF of 25 to 30%.    Patient moved to [place] from [place] end of  2016 and started a new job in [place].  End of 2016 she noticed shortness of " breath with exertion and gaining weight.  She was subsequently seen in the ER and admitted to [facility] for 3 days.  She was found to have severely reduced ejection fraction at 25% by echocardiogram.  A nuclear stress test was performed which showed no evidence of ischemia.  Per patient's report she was started on multiple medications at the same time.  She reports multiple side effects and she decided to come off the treatment.  Patient states that she stayed only on amlodipine for 3 years up until 6 months ago.  She decided to stop amlodipine 6 months ago.  In February of this year she started feeling similar SANTILLAN  and she was subsequently seen by PCP and cardiology.  She is currently taking amlodipine and hydrochlorothiazide.  Patient reports no chest pain, shortness of breath, dizziness, syncope, palpitations or lower extremity edema.  She is able to walk and do ADLs without difficulty.    Lifetime non-smoker.  No alcohol abuse.  No drug abuse.    No history of diabetes, hyperlipidemia or family history of heart failure/coronary artery disease. She was told be hypertensive when she was initially diagnosed with heart failure in 2017.    I have personally reviewed echocardiogram 7/24/2020 which shows LV dilation, LV hypertrophy and severely reduced LVEF of 25 to 30%.  No significant valve disease is noted.  IVC was not dilated.  Pulmonary hypertension was noted at 45 mmHg.    Patient is currently on amlodipine 5 mg and hydrochlorothiazide 12.5 mg    I have reviewed patient's most recent labs on 6/12/2020 which shows normal CBC, normal lipid profile and unremarkable BMP.  TSH is normal.  BNP is elevated at 2000.  HIV negative.  Hep C negative.  Albumin creatinine ratio elevated at 43mg/g.    EKG 6/12/2020 shows sinus rhythm left bundle branch block and left atrial abnormality.    Medications, personal, family, and social history reviewed with patient and revised.    PAST MEDICAL HISTORY:  No past medical  history on file.    CURRENT MEDICATIONS:  Current Outpatient Medications   Medication Sig Dispense Refill     ACE/ARB/ARNI NOT PRESCRIBED (INTENTIONAL) Please choose reason not prescribed, below       amLODIPine (NORVASC) 5 MG tablet Take 1 tablet (5 mg) by mouth daily 90 tablet 1     BETA BLOCKER NOT PRESCRIBED (INTENTIONAL) Beta Blocker not prescribed intentionally due to Refusal by patient       fluticasone (FLONASE) 50 MCG/ACT nasal spray Spray 2 sprays into both nostrils daily 18 mL 3     hydrochlorothiazide (HYDRODIURIL) 12.5 MG tablet Take 1 tablet (12.5 mg) by mouth daily 90 tablet 1     hydrochlorothiazide (MICROZIDE) 12.5 MG capsule Take 1 capsule by mouth once daily 90 capsule 1       PAST SURGICAL HISTORY:  No past surgical history on file.    ALLERGIES:   No Known Allergies    FAMILY HISTORY:  No family history on file.      SOCIAL HISTORY:  Social History     Tobacco Use     Smoking status: Never Smoker     Smokeless tobacco: Never Used   Substance Use Topics     Alcohol use: Never     Frequency: Never     Drug use: Never       ROS:   Constitutional: No fever, chills, or sweats. Weight stable.   ENT: No visual disturbance, ear ache, epistaxis, sore throat.   Cardiovascular: As per HPI.   Respiratory: No cough, hemoptysis.    GI: No nausea, vomiting, hematemesis, melena, or hematochezia.   : No hematuria.   Integument: Negative.   Psychiatric: Negative.   Hematologic:  No easy bruising, no easy bleeding.  Neuro: Negative.   Endocrinology: No significant heat or cold intolerance   Musculoskeletal: No myalgia.    Exam:  Physical Exam Elements attainable via telehealth:       Constitutional - alert and no distress    Eyes - no redness, no discharge    Respiratory - no cough, no labored breathing    Skin - no discoloration or lesions on the face or the arm.    Neurological -alert, normal speech,and affect, no tremor.     I have reviewed the labs and personally reviewed the imaging below and made my  comment in the assessment and plan.    Labs:  CBC RESULTS:   Lab Results   Component Value Date    WBC 5.8 06/12/2020    RBC 5.05 06/12/2020    HGB 14.9 06/12/2020    HCT 45.1 06/12/2020    MCV 89 06/12/2020    MCH 29.5 06/12/2020    MCHC 33.0 06/12/2020    RDW 15.5 (H) 06/12/2020     06/12/2020       BMP RESULTS:  Lab Results   Component Value Date     08/13/2020    POTASSIUM 3.6 08/13/2020    CHLORIDE 107 08/13/2020    CO2 23 08/13/2020    ANIONGAP 9 08/13/2020    GLC 75 08/13/2020    BUN 19 08/13/2020    CR 0.90 08/13/2020    GFRESTIMATED 69 08/13/2020    GFRESTBLACK 80 08/13/2020    JAVIER 9.5 08/13/2020      Echocardiogram 7/24/2020  The left left ventricle is. There is moderate eccentric left ventricular hypertrophy.  The visual ejection fraction is estimated at 25-30%.  Diastolic Doppler findings (E/E' ratio and/or other parameters) suggest left  ventricular filling pressures are increased.  There is severe global hypokinesia of the left ventricle. Portions of the  inferior wall appear severely hypokinetic.  Moderate (46-55mmHg) pulmonary hypertension is present.  There is mild (1+) mitral regurgitation.  The inferior vena cava was normal in size with preserved respiratory  variability.     Echocardiogram 8/18/2017 [facility]  Left ventricle is moderately dilated  Normal LV wall thickness  Left ventricle systolic function severely reduced at EF of 22%  No regional wall motion abnormality  RV systolic function mildly reduced   RV systolic pressure estimated at 49 mmHg    Assessment and Plan:   Ms. Rayo Street is a 59 year old  female with PMH significant for heart failure with reduced ejection fraction since 2017.  The patient has not been on guideline directed medical therapy since she has been diagnosed with HFrEF over the last 3 years.  (Though she was initially started on heart failure treatment she was not able to tolerate them due to side effects and stopped all of  those on her own.)  Only medication she has been on is amlodipine and recently hydrochlorothiazide.  Today I had a very lengthy discussion with the patient with regards to definition of heart failure, types of heart failure, etiology of heart failure and role of the medications.  The patient had multiple questions and she was initially very resistant to change current treatment due to multiple drug reactions in the past.  After discussion for an hour or so she agreed to switch from amlodipine to angiotensin receptor blocker valsartan.  I have informed the patient that I will start low-dose and slowly increase the dose and we will consider starting her on beta-blocker during our next visits.  At this time patient's heart failure appears to be nonischemic.  I recommended cardiac MRI for further assessment.  Patient is agreeable to proceed.  I will start patient on valsartan 80 mg and discontinue amlodipine and hydrochlorothiazide.  Patient will continue to monitor BP at home and if she sees high blood pressure she will let us know.    Plan:  1.  Heart failure with reduced ejection fraction: New York Heart Association functional class I.  Discontinue amlodipine and hydrochlorothiazide.  Start valsartan 80 mg daily (patient recalls side effects from ACE inhibitor's and losartan in the past).  We will schedule cardiac MRI.    Return to clinic once the cardiac MRI result is available to discuss.    A total of  60 minutes spent through telephone encounter today with greater than 50% of the time spent in counseling and coordinating cares of the issues above.     Please donot hesitate to contact me if you have any questions or concerns. Again, thank you for allowing me to participate in the care of your patient.    Shaina SCHERER MD  [facility] Division of Cardiology  Pager [phone]         Please do not hesitate to contact me if you have any questions/concerns.     Sincerely,     Shaina Scherer MD

## 2020-11-13 RX ORDER — AMLODIPINE BESYLATE 5 MG/1
TABLET ORAL
Qty: 90 TABLET | Refills: 0 | OUTPATIENT
Start: 2020-11-13

## 2020-12-03 RX ORDER — AMLODIPINE BESYLATE 5 MG/1
TABLET ORAL
Start: 2020-12-03

## 2020-12-03 NOTE — TELEPHONE ENCOUNTER
Amlodipine:    Discontinued  Not on current medication list  Note from 9/14/2020 virtual visit with cardiology:    After discussion for an hour or so she agreed to switch from amlodipine to angiotensin receptor blocker valsartan    I will start patient on valsartan 80 mg and discontinue amlodipine and hydrochlorothiazide.    Plan:  1.  Heart failure with reduced ejection fraction: New York Heart Association functional class I.  Discontinue amlodipine and hydrochlorothiazide.  Start valsartan 80 mg daily (patient recalls side effects from ACE inhibitor's and losartan in the past).  We will schedule cardiac MRI.    Stacy Pugh RN

## 2020-12-11 ENCOUNTER — TELEPHONE (OUTPATIENT)
Dept: FAMILY MEDICINE | Facility: CLINIC | Age: 60
End: 2020-12-11

## 2020-12-11 NOTE — TELEPHONE ENCOUNTER
Reason for Call:  Medication or medication refill:    Do you use a [facility]?  Name of the pharmacy and phone number for the current request:  [facility] in [place] Phone # [phone] on [address] [place] [address]    Name of the medication requested: AMLODIPINE 5 MG    Other request: Pt has allergy to med VALSARTAN She need Amlodipine     Can we leave a detailed message on this number? YES    Phone number patient can be reached at: Home number on file [phone] (home)    Best Time: anytime    Call taken on 12/11/2020 at 12:43 PM by Yessi Davis

## 2021-01-04 ENCOUNTER — HEALTH MAINTENANCE LETTER (OUTPATIENT)
Age: 61
End: 2021-01-04

## 2021-06-04 ENCOUNTER — OFFICE VISIT (OUTPATIENT)
Dept: FAMILY MEDICINE | Facility: CLINIC | Age: 61
End: 2021-06-04
Payer: COMMERCIAL

## 2021-06-04 VITALS
HEART RATE: 68 BPM | TEMPERATURE: 98 F | RESPIRATION RATE: 16 BRPM | OXYGEN SATURATION: 98 % | SYSTOLIC BLOOD PRESSURE: 130 MMHG | DIASTOLIC BLOOD PRESSURE: 78 MMHG

## 2021-06-04 DIAGNOSIS — I50.20 HEART FAILURE WITH REDUCED EJECTION FRACTION, NYHA CLASS I (H): ICD-10-CM

## 2021-06-04 DIAGNOSIS — R60.9 EDEMA, UNSPECIFIED TYPE: ICD-10-CM

## 2021-06-04 DIAGNOSIS — E78.5 HYPERLIPIDEMIA, UNSPECIFIED HYPERLIPIDEMIA TYPE: ICD-10-CM

## 2021-06-04 DIAGNOSIS — I10 BENIGN ESSENTIAL HYPERTENSION: ICD-10-CM

## 2021-06-04 LAB
ERYTHROCYTE [DISTWIDTH] IN BLOOD BY AUTOMATED COUNT: 14.7 % (ref 10–15)
HCT VFR BLD AUTO: 39.9 % (ref 35–47)
HGB BLD-MCNC: 13.8 G/DL (ref 11.7–15.7)
MCH RBC QN AUTO: 30 PG (ref 26.5–33)
MCHC RBC AUTO-ENTMCNC: 34.6 G/DL (ref 31.5–36.5)
MCV RBC AUTO: 87 FL (ref 78–100)
PLATELET # BLD AUTO: 301 10E9/L (ref 150–450)
RBC # BLD AUTO: 4.6 10E12/L (ref 3.8–5.2)
WBC # BLD AUTO: 7.2 10E9/L (ref 4–11)

## 2021-06-04 PROCEDURE — 85027 COMPLETE CBC AUTOMATED: CPT | Performed by: FAMILY MEDICINE

## 2021-06-04 PROCEDURE — 99214 OFFICE O/P EST MOD 30 MIN: CPT | Performed by: FAMILY MEDICINE

## 2021-06-04 PROCEDURE — 80061 LIPID PANEL: CPT | Performed by: FAMILY MEDICINE

## 2021-06-04 PROCEDURE — 80048 BASIC METABOLIC PNL TOTAL CA: CPT | Performed by: FAMILY MEDICINE

## 2021-06-04 PROCEDURE — 36415 COLL VENOUS BLD VENIPUNCTURE: CPT | Performed by: FAMILY MEDICINE

## 2021-06-04 PROCEDURE — 82043 UR ALBUMIN QUANTITATIVE: CPT | Performed by: FAMILY MEDICINE

## 2021-06-04 RX ORDER — AMLODIPINE BESYLATE 5 MG/1
5 TABLET ORAL DAILY
Qty: 90 TABLET | Refills: 1 | Status: SHIPPED | OUTPATIENT
Start: 2021-06-04 | End: 2022-06-02

## 2021-06-04 RX ORDER — HYDROCHLOROTHIAZIDE 12.5 MG/1
12.5 TABLET ORAL DAILY
Qty: 90 TABLET | Refills: 1 | Status: SHIPPED | OUTPATIENT
Start: 2021-06-04 | End: 2022-11-02

## 2021-06-04 NOTE — PROGRESS NOTES
Assessment & Plan      1. Heart failure with reduced ejection fraction, NYHA class I (H)  - advised to re-establish care with cardiology - recommended for pt to see Dr. Quinonez    2. Benign essential hypertension  - controlled, continue current regimen  - hydrochlorothiazide (HYDRODIURIL) 12.5 MG tablet; Take 1 tablet (12.5 mg) by mouth daily  Dispense: 90 tablet; Refill: 1  - amLODIPine (NORVASC) 5 MG tablet; Take 1 tablet (5 mg) by mouth daily  Dispense: 90 tablet; Refill: 1  - Basic metabolic panel  (Ca, Cl, CO2, Creat, Gluc, K, Na, BUN)  - Albumin Random Urine Quantitative with Creat Ratio  - CBC with platelets    3. Edema, unspecified type  - hydrochlorothiazide (HYDRODIURIL) 12.5 MG tablet; Take 1 tablet (12.5 mg) by mouth daily  Dispense: 90 tablet; Refill: 1    4. Hyperlipidemia, unspecified hyperlipidemia type  - The 10-year ASCVD risk score (Tammy DORIS Jr., et al., 2013) is: 8.9%    Values used to calculate the score:      Age: 60 years      Sex: Female      Is Non- : Yes      Diabetic: No      Tobacco smoker: No      Systolic Blood Pressure: 130 mmHg      Is BP treated: Yes      HDL Cholesterol: 83 mg/dL      Total Cholesterol: 302 mg/dL  - Lipid Profile (Chol, Trig, HDL, LDL calc)      Laura Heart MD  [facility]    Advised to follow up with pap.    Subjective   Media is a 60 year old who presents for the following health issues     HPI     Hypertension Follow-up      Do you check your blood pressure regularly outside of the clinic? Yes     Are you following a low salt diet? Yes    Are your blood pressures ever more than 140 on the top number (systolic) OR more   than 90 on the bottom number (diastolic), for example 140/90? No      How many servings of fruits and vegetables do you eat daily?  2-3    On average, how many sweetened beverages do you drink each day (Examples: soda, juice, sweet tea, etc.  Do NOT count diet or artificially sweetened  beverages)?   0    How many days per week do you exercise enough to make your heart beat faster? 3 or less    How many minutes a day do you exercise enough to make your heart beat faster? 9 or less    How many days per week do you miss taking your medication? 0    If she eats too much salt has leg edema. Otherwise no chest pain, shortness of breath, orthopnea, coughing or wheezing.    The 10-year ASCVD risk score (Tammy GEORGE Jr., et al., 2013) is: 7.5%    Values used to calculate the score:      Age: 60 years      Sex: Female      Is Non- : Yes      Diabetic: No      Tobacco smoker: No      Systolic Blood Pressure: 130 mmHg      Is BP treated: Yes      HDL Cholesterol: 59 mg/dL      Total Cholesterol: 206 mg/dL    Review of Systems         Objective    There were no vitals taken for this visit.  There is no height or weight on file to calculate BMI.  Physical Exam   /78 (BP Location: Left arm, Patient Position: Sitting, Cuff Size: Adult Regular)   Pulse 68   Temp 98  F (36.7  C) (Tympanic)   Resp 16   SpO2 98%   GENERAL: healthy, alert and no distress  RESP: lungs clear to auscultation - no rales, rhonchi or wheezes  CV: regular rate and rhythm, normal S1 S2  MS:no peripheral edema

## 2021-06-05 LAB
ANION GAP SERPL CALCULATED.3IONS-SCNC: 6 MMOL/L (ref 3–14)
BUN SERPL-MCNC: 15 MG/DL (ref 7–30)
CALCIUM SERPL-MCNC: 9.1 MG/DL (ref 8.5–10.1)
CHLORIDE SERPL-SCNC: 108 MMOL/L (ref 94–109)
CHOLEST SERPL-MCNC: 302 MG/DL
CO2 SERPL-SCNC: 24 MMOL/L (ref 20–32)
CREAT SERPL-MCNC: 0.85 MG/DL (ref 0.52–1.04)
CREAT UR-MCNC: 374 MG/DL
GFR SERPL CREATININE-BSD FRML MDRD: 74 ML/MIN/{1.73_M2}
GLUCOSE SERPL-MCNC: 83 MG/DL (ref 70–99)
HDLC SERPL-MCNC: 83 MG/DL
LDLC SERPL CALC-MCNC: 195 MG/DL
MICROALBUMIN UR-MCNC: 53 MG/L
MICROALBUMIN/CREAT UR: 14.28 MG/G CR (ref 0–25)
NONHDLC SERPL-MCNC: 219 MG/DL
POTASSIUM SERPL-SCNC: 3.4 MMOL/L (ref 3.4–5.3)
SODIUM SERPL-SCNC: 138 MMOL/L (ref 133–144)
TRIGL SERPL-MCNC: 121 MG/DL

## 2021-10-10 ENCOUNTER — HEALTH MAINTENANCE LETTER (OUTPATIENT)
Age: 61
End: 2021-10-10

## 2022-05-31 DIAGNOSIS — I10 BENIGN ESSENTIAL HYPERTENSION: ICD-10-CM

## 2022-06-02 RX ORDER — AMLODIPINE BESYLATE 5 MG/1
TABLET ORAL
Qty: 90 TABLET | Refills: 0 | Status: SHIPPED | OUTPATIENT
Start: 2022-06-02 | End: 2022-11-02

## 2022-06-02 NOTE — TELEPHONE ENCOUNTER
Passes protocol but gap in medication and last seen 6/4/21.    Last Written Prescription Date:  6/4/21  Last Fill Quantity: 90,  # refills: 1   Last office visit: 6/4/2021 with prescribing provider:  Sly   Future Office Visit:      Scheduling; please reach out to pt for annual    JULIO Bains RN  [facility]

## 2022-09-24 ENCOUNTER — HEALTH MAINTENANCE LETTER (OUTPATIENT)
Age: 62
End: 2022-09-24

## 2022-11-01 ENCOUNTER — TELEPHONE (OUTPATIENT)
Dept: FAMILY MEDICINE | Facility: CLINIC | Age: 62
End: 2022-11-01

## 2022-11-01 DIAGNOSIS — R60.9 EDEMA, UNSPECIFIED TYPE: ICD-10-CM

## 2022-11-01 DIAGNOSIS — I10 BENIGN ESSENTIAL HYPERTENSION: ICD-10-CM

## 2022-11-01 NOTE — TELEPHONE ENCOUNTER
Reason for Call:  Other call back    Detailed comments: Patient is on amLODIPine (NORVASC) 5 MG tablet ASC, and this medication gives patient heart palpitations and patient states she has taken amLODIPine (NORVASC) 5 MG tablet BES, prior to switching to the ASC dose and BES worked just fine for patient with no side effects. Patient would like to know if she can get switched or if she needs to make an appointment to be seen. Patient is also requesting a refill of hydrochlorothiazide (MICROZIDE) 12.5 MG capsule. Pharmacy is [facility]. Patient would like a call back.     Phone Number Patient can be reached at: Cell number on file:    Telephone Information:   Mobile [phone]       Best Time: Any    Can we leave a detailed message on this number? YES    Call taken on 11/1/2022 at 3:46 PM by Shira Riggs

## 2022-11-02 NOTE — TELEPHONE ENCOUNTER
Dr. Heart-Please review and advise:  1. Writer is not familiar with Amlodipine ASC versus BES so wanted to check with you  2. Of note, patient also overdue for annual visit and labs  3. Tiff refills pended and Amlodipine pended with note to pharmacy patient is requesting Amlodipine 5 mg tablet BES    Last visit was 6/4/21 with Dr. Heart    Thank you!  MATT TurciosN, RN-[facility]

## 2022-11-11 RX ORDER — AMLODIPINE BESYLATE 5 MG/1
5 TABLET ORAL DAILY
Qty: 90 TABLET | Refills: 0 | Status: SHIPPED | OUTPATIENT
Start: 2022-11-11

## 2022-11-11 RX ORDER — HYDROCHLOROTHIAZIDE 12.5 MG/1
12.5 TABLET ORAL DAILY
Qty: 90 TABLET | Refills: 0 | Status: SHIPPED | OUTPATIENT
Start: 2022-11-11

## 2023-01-29 ENCOUNTER — HEALTH MAINTENANCE LETTER (OUTPATIENT)
Age: 63
End: 2023-01-29

## 2023-07-19 ENCOUNTER — NURSE TRIAGE (OUTPATIENT)
Dept: NURSING | Facility: CLINIC | Age: 63
End: 2023-07-19

## 2023-07-20 NOTE — TELEPHONE ENCOUNTER
Patient has a cough.  Patient  was seen about 2-3 weeks ago.  Patient states she was given a medication ( possibly steroid) and she said she felt a little better for 1 -2 days and then symptoms were completely back.    Patient is able to breath, no SOB at rest, does bring up some mucous, no wheezing or stridor, no chest pain/pressure, no fever.    Care advise: be seen in 24 hours.  Continue the OTC medication, warm fluids/warm mist for spells, push fluids.  Call back if you get worse.  Patient is in [place] and wants to know where to go to be seen.  I explained I would call [facility] and ask what UCs they cover in [facility].    Trupti Chan RN   07/19/23 8:56 PM  [facility] Nurse Advisor  Reason for Disposition   SEVERE coughing spells (e.g., whooping sound after coughing, vomiting after coughing)    Additional Information   Negative: SEVERE difficulty breathing (e.g., struggling for each breath, speaks in single words)   Negative: Bluish (or gray) lips or face now   Negative: [1] Difficulty breathing AND [2] exposure to flames, smoke, or fumes   Negative: [1] Stridor AND [2] difficulty breathing   Negative: Sounds like a life-threatening emergency to the triager   Negative: [1] Previous asthma attacks AND [2] this feels like asthma attack   Negative: Dry cough (non-productive;  no sputum or minimal clear sputum)   Negative: [1] MODERATE difficulty breathing (e.g., speaks in phrases, SOB even at rest, pulse 100-120) AND [2] still present when not coughing   Negative: Chest pain  (Exception: MILD central chest pain, present only when coughing)   Negative: Patient sounds very sick or weak to the triager   Negative: [1] MILD difficulty breathing (e.g., minimal/no SOB at rest, SOB with walking, pulse <100) AND [2] still present when not coughing   Negative: [1] Coughed up blood AND [2] > 1 tablespoon (15 ml)  (Exception: Blood-tinged sputum.)   Negative: Fever > 103 F (39.4 C)   Negative: [1] Fever > 101 F  (38.3 C) AND [2] age > 60 years   Negative: [1] Fever > 100.0 F (37.8 C) AND [2] bedridden (e.g., nursing home patient, CVA, chronic illness, recovering from surgery)   Negative: [1] Fever > 100.0 F (37.8 C) AND [2] diabetes mellitus or weak immune system (e.g., HIV positive, cancer chemo, splenectomy, organ transplant, chronic steroids)   Negative: Wheezing is present    Protocols used: Cough - Acute [id number]-D-DN

## 2023-09-05 NOTE — RESULT ENCOUNTER NOTE
"Results as expected and stable; patient seen by cardiology today.  Jaye \"Virgilio\" EAGLE Quinones" Recheck lipid panel with recent weight loss; continue a low-fat, low-cholesterol diet along with regular exercise.

## 2024-02-25 ENCOUNTER — HEALTH MAINTENANCE LETTER (OUTPATIENT)
Age: 64
End: 2024-02-25

## 2024-09-03 NOTE — PATIENT INSTRUCTIONS
Bedside and Verbal shift change report given to LUIS Jones RN (oncoming nurse) by HIREN Fong RN (offgoing nurse). Report included the following information Nurse Handoff Report, Adult Overview, Surgery Report, Intake/Output, MAR, and Recent Results.     Patient Instructions:  Dr. Benton reviewed the records that were sent from [facility].    He recommends :  -lab appointment for BMP test (blood draw)  -echocardiogram  -follow up appointment - 3 months      It was a pleasure to see you in the cardiology clinic today.    We are encouraging the use of [facility] to communicate with your Healthcare Provider.  If you have any questions, call  Dane Blunt LPN, at [phone].  Press Option #1 for the [facility], and then press Option #4 for nursing.  Cardiology Fax  : [phone]      If you have an urgent need after hours (8:00 am to 4:30 pm) please call [phone] and ask for the cardiology fellow on call.